# Patient Record
Sex: FEMALE | Race: WHITE | Employment: UNEMPLOYED | URBAN - METROPOLITAN AREA
[De-identification: names, ages, dates, MRNs, and addresses within clinical notes are randomized per-mention and may not be internally consistent; named-entity substitution may affect disease eponyms.]

---

## 2017-01-01 ENCOUNTER — HOME CARE VISIT (OUTPATIENT)
Dept: HOME HEALTH SERVICES | Facility: HOME HEALTH | Age: 72
End: 2017-01-01
Payer: MEDICARE

## 2017-01-01 ENCOUNTER — HOME CARE VISIT (OUTPATIENT)
Dept: SCHEDULING | Facility: HOME HEALTH | Age: 72
End: 2017-01-01
Payer: MEDICARE

## 2017-01-01 ENCOUNTER — APPOINTMENT (OUTPATIENT)
Dept: CT IMAGING | Age: 72
DRG: 871 | End: 2017-01-01
Attending: INTERNAL MEDICINE
Payer: MEDICARE

## 2017-01-01 ENCOUNTER — HOSPITAL ENCOUNTER (EMERGENCY)
Age: 72
Discharge: HOME | End: 2017-02-04
Attending: INTERNAL MEDICINE
Payer: MEDICARE

## 2017-01-01 ENCOUNTER — APPOINTMENT (OUTPATIENT)
Dept: GENERAL RADIOLOGY | Age: 72
DRG: 871 | End: 2017-01-01
Attending: PHYSICIAN ASSISTANT
Payer: MEDICARE

## 2017-01-01 ENCOUNTER — HOSPITAL ENCOUNTER (INPATIENT)
Age: 72
LOS: 4 days | DRG: 871 | End: 2017-03-20
Attending: INTERNAL MEDICINE | Admitting: HOSPITALIST
Payer: MEDICARE

## 2017-01-01 ENCOUNTER — HOME HEALTH ADMISSION (OUTPATIENT)
Dept: HOME HEALTH SERVICES | Facility: HOME HEALTH | Age: 72
End: 2017-01-01
Payer: MEDICARE

## 2017-01-01 ENCOUNTER — HOME CARE VISIT (OUTPATIENT)
Dept: HOME HEALTH SERVICES | Facility: HOME HEALTH | Age: 72
End: 2017-01-01

## 2017-01-01 ENCOUNTER — APPOINTMENT (OUTPATIENT)
Dept: GENERAL RADIOLOGY | Age: 72
End: 2017-01-01
Attending: EMERGENCY MEDICINE
Payer: MEDICARE

## 2017-01-01 ENCOUNTER — APPOINTMENT (OUTPATIENT)
Dept: GENERAL RADIOLOGY | Age: 72
DRG: 871 | End: 2017-01-01
Attending: INTERNAL MEDICINE
Payer: MEDICARE

## 2017-01-01 ENCOUNTER — HOSPITAL ENCOUNTER (INPATIENT)
Age: 72
LOS: 3 days | Discharge: NURSING, REHAB OR LONG-TERM CARE | DRG: 871 | End: 2017-03-07
Attending: INTERNAL MEDICINE | Admitting: INTERNAL MEDICINE
Payer: MEDICARE

## 2017-01-01 ENCOUNTER — HOSPITAL ENCOUNTER (OUTPATIENT)
Dept: MRI IMAGING | Age: 72
Discharge: HOME | End: 2017-02-08
Attending: ORTHOPAEDIC SURGERY
Payer: MEDICARE

## 2017-01-01 ENCOUNTER — APPOINTMENT (OUTPATIENT)
Dept: GENERAL RADIOLOGY | Age: 72
End: 2017-01-01
Attending: INTERNAL MEDICINE
Payer: MEDICARE

## 2017-01-01 ENCOUNTER — HOSPITAL ENCOUNTER (EMERGENCY)
Age: 72
Discharge: HOME | End: 2017-02-09
Attending: FAMILY MEDICINE
Payer: MEDICARE

## 2017-01-01 VITALS
RESPIRATION RATE: 16 BRPM | OXYGEN SATURATION: 95 % | WEIGHT: 212 LBS | HEART RATE: 69 BPM | TEMPERATURE: 96.6 F | HEIGHT: 65 IN | SYSTOLIC BLOOD PRESSURE: 121 MMHG | BODY MASS INDEX: 35.32 KG/M2 | DIASTOLIC BLOOD PRESSURE: 79 MMHG

## 2017-01-01 VITALS
RESPIRATION RATE: 18 BRPM | OXYGEN SATURATION: 98 % | HEIGHT: 65 IN | WEIGHT: 212 LBS | BODY MASS INDEX: 35.32 KG/M2 | DIASTOLIC BLOOD PRESSURE: 74 MMHG | HEART RATE: 87 BPM | SYSTOLIC BLOOD PRESSURE: 162 MMHG | TEMPERATURE: 97.9 F

## 2017-01-01 VITALS
HEART RATE: 76 BPM | SYSTOLIC BLOOD PRESSURE: 161 MMHG | TEMPERATURE: 96.8 F | OXYGEN SATURATION: 96 % | DIASTOLIC BLOOD PRESSURE: 84 MMHG | RESPIRATION RATE: 16 BRPM

## 2017-01-01 VITALS
TEMPERATURE: 98 F | OXYGEN SATURATION: 96 % | DIASTOLIC BLOOD PRESSURE: 48 MMHG | HEIGHT: 65 IN | WEIGHT: 216 LBS | HEART RATE: 56 BPM | RESPIRATION RATE: 16 BRPM | BODY MASS INDEX: 35.99 KG/M2 | SYSTOLIC BLOOD PRESSURE: 112 MMHG

## 2017-01-01 VITALS
DIASTOLIC BLOOD PRESSURE: 71 MMHG | OXYGEN SATURATION: 98 % | HEART RATE: 75 BPM | RESPIRATION RATE: 16 BRPM | SYSTOLIC BLOOD PRESSURE: 121 MMHG | BODY MASS INDEX: 35.32 KG/M2 | TEMPERATURE: 98 F | WEIGHT: 212 LBS | HEIGHT: 65 IN

## 2017-01-01 VITALS
DIASTOLIC BLOOD PRESSURE: 31 MMHG | TEMPERATURE: 99.7 F | SYSTOLIC BLOOD PRESSURE: 110 MMHG | HEART RATE: 106 BPM | HEIGHT: 65 IN | OXYGEN SATURATION: 68 % | BODY MASS INDEX: 34.05 KG/M2 | WEIGHT: 204.4 LBS | RESPIRATION RATE: 38 BRPM

## 2017-01-01 VITALS
OXYGEN SATURATION: 97 % | DIASTOLIC BLOOD PRESSURE: 80 MMHG | RESPIRATION RATE: 16 BRPM | TEMPERATURE: 98 F | SYSTOLIC BLOOD PRESSURE: 130 MMHG | HEART RATE: 78 BPM

## 2017-01-01 VITALS — DIASTOLIC BLOOD PRESSURE: 78 MMHG | SYSTOLIC BLOOD PRESSURE: 140 MMHG

## 2017-01-01 VITALS — DIASTOLIC BLOOD PRESSURE: 80 MMHG | SYSTOLIC BLOOD PRESSURE: 150 MMHG

## 2017-01-01 VITALS — DIASTOLIC BLOOD PRESSURE: 80 MMHG | SYSTOLIC BLOOD PRESSURE: 170 MMHG

## 2017-01-01 DIAGNOSIS — J11.1 INFLUENZA: ICD-10-CM

## 2017-01-01 DIAGNOSIS — J96.01 ACUTE RESPIRATORY FAILURE WITH HYPOXIA (HCC): ICD-10-CM

## 2017-01-01 DIAGNOSIS — R11.2 NAUSEA, VOMITING AND DIARRHEA: ICD-10-CM

## 2017-01-01 DIAGNOSIS — R41.0 TRANSIENT CONFUSION: ICD-10-CM

## 2017-01-01 DIAGNOSIS — E86.0 DEHYDRATION: ICD-10-CM

## 2017-01-01 DIAGNOSIS — R11.2 NON-INTRACTABLE VOMITING WITH NAUSEA, UNSPECIFIED VOMITING TYPE: ICD-10-CM

## 2017-01-01 DIAGNOSIS — S70.01XA CONTUSION OF RIGHT HIP, INITIAL ENCOUNTER: ICD-10-CM

## 2017-01-01 DIAGNOSIS — A41.9 SEPSIS, DUE TO UNSPECIFIED ORGANISM: Primary | ICD-10-CM

## 2017-01-01 DIAGNOSIS — R33.9 URINARY RETENTION: ICD-10-CM

## 2017-01-01 DIAGNOSIS — R19.7 NAUSEA, VOMITING AND DIARRHEA: ICD-10-CM

## 2017-01-01 DIAGNOSIS — N39.0 URINARY TRACT INFECTION, SITE UNSPECIFIED: Primary | ICD-10-CM

## 2017-01-01 DIAGNOSIS — M17.11 PRIMARY OSTEOARTHRITIS OF RIGHT KNEE: ICD-10-CM

## 2017-01-01 DIAGNOSIS — R41.82 ALTERED MENTAL STATUS, UNSPECIFIED ALTERED MENTAL STATUS TYPE: ICD-10-CM

## 2017-01-01 DIAGNOSIS — Z66 DO NOT RESUSCITATE: ICD-10-CM

## 2017-01-01 DIAGNOSIS — J18.9 HCAP (HEALTHCARE-ASSOCIATED PNEUMONIA): ICD-10-CM

## 2017-01-01 DIAGNOSIS — R65.10 SIRS (SYSTEMIC INFLAMMATORY RESPONSE SYNDROME) (HCC): Primary | ICD-10-CM
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
## 2017-01-01 DIAGNOSIS — R93.89 INFILTRATE NOTED ON IMAGING STUDY: ICD-10-CM

## 2017-01-01 DIAGNOSIS — R40.1 STUPOR: ICD-10-CM

## 2017-01-01 DIAGNOSIS — M25.551 RIGHT HIP PAIN: ICD-10-CM

## 2017-01-01 LAB
ALBUMIN SERPL BCP-MCNC: 2.6 G/DL (ref 3.4–5)
ALBUMIN SERPL BCP-MCNC: 2.7 G/DL (ref 3.4–5)
ALBUMIN SERPL BCP-MCNC: 2.7 G/DL (ref 3.4–5)
ALBUMIN SERPL BCP-MCNC: 2.9 G/DL (ref 3.4–5)
ALBUMIN SERPL BCP-MCNC: 3.8 G/DL (ref 3.4–5)
ALBUMIN SERPL BCP-MCNC: 4 G/DL (ref 3.4–5)
ALBUMIN/GLOB SERPL: 0.6 {RATIO} (ref 0.8–1.7)
ALBUMIN/GLOB SERPL: 0.8 {RATIO} (ref 0.8–1.7)
ALBUMIN/GLOB SERPL: 0.8 {RATIO} (ref 0.8–1.7)
ALBUMIN/GLOB SERPL: 0.9 {RATIO} (ref 0.8–1.7)
ALBUMIN/GLOB SERPL: 1 {RATIO} (ref 0.8–1.7)
ALBUMIN/GLOB SERPL: 1.2 {RATIO} (ref 0.8–1.7)
ALP SERPL-CCNC: 204 U/L (ref 45–117)
ALP SERPL-CCNC: 46 U/L (ref 45–117)
ALP SERPL-CCNC: 52 U/L (ref 45–117)
ALP SERPL-CCNC: 55 U/L (ref 45–117)
ALP SERPL-CCNC: 74 U/L (ref 45–117)
ALP SERPL-CCNC: 78 U/L (ref 45–117)
ALT SERPL-CCNC: 13 U/L (ref 13–56)
ALT SERPL-CCNC: 15 U/L (ref 13–56)
ALT SERPL-CCNC: 15 U/L (ref 13–56)
ALT SERPL-CCNC: 17 U/L (ref 13–56)
ALT SERPL-CCNC: 25 U/L (ref 13–56)
ALT SERPL-CCNC: 29 U/L (ref 13–56)
AMMONIA PLAS-SCNC: 35 UMOL/L (ref 11–32)
AMMONIA PLAS-SCNC: 36 UMOL/L (ref 11–32)
ANION GAP BLD CALC-SCNC: 12 MMOL/L (ref 3–18)
ANION GAP BLD CALC-SCNC: 13 MMOL/L (ref 3–18)
ANION GAP BLD CALC-SCNC: 15 MMOL/L (ref 3–18)
ANION GAP BLD CALC-SCNC: 7 MMOL/L (ref 3–18)
ANION GAP BLD CALC-SCNC: 7 MMOL/L (ref 3–18)
ANION GAP BLD CALC-SCNC: 8 MMOL/L (ref 3–18)
ANION GAP BLD CALC-SCNC: 9 MMOL/L (ref 3–18)
APPEARANCE UR: ABNORMAL
APPEARANCE UR: ABNORMAL
APPEARANCE UR: CLEAR
ARTERIAL PATENCY WRIST A: YES
AST SERPL W P-5'-P-CCNC: 19 U/L (ref 15–37)
AST SERPL W P-5'-P-CCNC: 23 U/L (ref 15–37)
AST SERPL W P-5'-P-CCNC: 27 U/L (ref 15–37)
AST SERPL W P-5'-P-CCNC: 28 U/L (ref 15–37)
AST SERPL W P-5'-P-CCNC: 30 U/L (ref 15–37)
AST SERPL W P-5'-P-CCNC: 37 U/L (ref 15–37)
ATRIAL RATE: 117 BPM
ATRIAL RATE: 62 BPM
ATRIAL RATE: 62 BPM
ATRIAL RATE: 65 BPM
ATRIAL RATE: 79 BPM
BACTERIA SPEC CULT: ABNORMAL
BACTERIA SPEC CULT: NORMAL
BACTERIA SPEC CULT: NORMAL
BACTERIA URNS QL MICRO: ABNORMAL /HPF
BASE DEFICIT BLD-SCNC: 2 MMOL/L
BASOPHILS # BLD AUTO: 0 K/UL (ref 0–0.06)
BASOPHILS # BLD AUTO: 0 K/UL (ref 0–0.1)
BASOPHILS # BLD: 0 % (ref 0–2)
BASOPHILS # BLD: 0 % (ref 0–3)
BASOPHILS # BLD: 1 % (ref 0–2)
BASOPHILS # BLD: 1 % (ref 0–2)
BDY SITE: ABNORMAL
BILIRUB SERPL-MCNC: 0.5 MG/DL (ref 0.2–1)
BILIRUB SERPL-MCNC: 0.5 MG/DL (ref 0.2–1)
BILIRUB SERPL-MCNC: 0.7 MG/DL (ref 0.2–1)
BILIRUB SERPL-MCNC: 1 MG/DL (ref 0.2–1)
BILIRUB SERPL-MCNC: 1.1 MG/DL (ref 0.2–1)
BILIRUB SERPL-MCNC: 1.7 MG/DL (ref 0.2–1)
BILIRUB UR QL: NEGATIVE
BNP SERPL-MCNC: 3394 PG/ML (ref 0–900)
BUN SERPL-MCNC: 17 MG/DL (ref 7–18)
BUN SERPL-MCNC: 17 MG/DL (ref 7–18)
BUN SERPL-MCNC: 18 MG/DL (ref 7–18)
BUN SERPL-MCNC: 21 MG/DL (ref 7–18)
BUN SERPL-MCNC: 22 MG/DL (ref 7–18)
BUN SERPL-MCNC: 52 MG/DL (ref 7–18)
BUN SERPL-MCNC: 57 MG/DL (ref 7–18)
BUN SERPL-MCNC: 66 MG/DL (ref 7–18)
BUN SERPL-MCNC: 75 MG/DL (ref 7–18)
BUN/CREAT SERPL: 16 (ref 12–20)
BUN/CREAT SERPL: 17 (ref 12–20)
BUN/CREAT SERPL: 18 (ref 12–20)
BUN/CREAT SERPL: 38 (ref 12–20)
BUN/CREAT SERPL: 44 (ref 12–20)
BUN/CREAT SERPL: 54 (ref 12–20)
BUN/CREAT SERPL: 56 (ref 12–20)
CALCIUM SERPL-MCNC: 8 MG/DL (ref 8.5–10.1)
CALCIUM SERPL-MCNC: 8.1 MG/DL (ref 8.5–10.1)
CALCIUM SERPL-MCNC: 8.1 MG/DL (ref 8.5–10.1)
CALCIUM SERPL-MCNC: 8.5 MG/DL (ref 8.5–10.1)
CALCIUM SERPL-MCNC: 8.8 MG/DL (ref 8.5–10.1)
CALCIUM SERPL-MCNC: 8.9 MG/DL (ref 8.5–10.1)
CALCIUM SERPL-MCNC: 9.6 MG/DL (ref 8.5–10.1)
CALCULATED P AXIS, ECG09: 31 DEGREES
CALCULATED P AXIS, ECG09: 35 DEGREES
CALCULATED P AXIS, ECG09: 35 DEGREES
CALCULATED P AXIS, ECG09: 39 DEGREES
CALCULATED R AXIS, ECG10: -17 DEGREES
CALCULATED R AXIS, ECG10: -22 DEGREES
CALCULATED R AXIS, ECG10: -25 DEGREES
CALCULATED R AXIS, ECG10: -31 DEGREES
CALCULATED R AXIS, ECG10: -31 DEGREES
CALCULATED T AXIS, ECG11: 107 DEGREES
CALCULATED T AXIS, ECG11: 121 DEGREES
CALCULATED T AXIS, ECG11: 22 DEGREES
CALCULATED T AXIS, ECG11: 22 DEGREES
CALCULATED T AXIS, ECG11: 51 DEGREES
CHLORIDE SERPL-SCNC: 100 MMOL/L (ref 100–108)
CHLORIDE SERPL-SCNC: 103 MMOL/L (ref 100–108)
CHLORIDE SERPL-SCNC: 104 MMOL/L (ref 100–108)
CHLORIDE SERPL-SCNC: 105 MMOL/L (ref 100–108)
CHLORIDE SERPL-SCNC: 106 MMOL/L (ref 100–108)
CHLORIDE SERPL-SCNC: 110 MMOL/L (ref 100–108)
CHLORIDE SERPL-SCNC: 113 MMOL/L (ref 100–108)
CHLORIDE SERPL-SCNC: 118 MMOL/L (ref 100–108)
CHLORIDE SERPL-SCNC: 97 MMOL/L (ref 100–108)
CK MB CFR SERPL CALC: 0.7 % (ref 0–4)
CK MB CFR SERPL CALC: 0.9 % (ref 0–4)
CK MB CFR SERPL CALC: 1.1 % (ref 0–4)
CK MB CFR SERPL CALC: ABNORMAL % (ref 0–4)
CK MB SERPL-MCNC: 0.9 NG/ML (ref 0.5–3.6)
CK MB SERPL-MCNC: 1 NG/ML (ref 5–25)
CK MB SERPL-MCNC: 1.3 NG/ML (ref 5–25)
CK MB SERPL-MCNC: <1 NG/ML (ref 5–25)
CK SERPL-CCNC: 113 U/L (ref 26–192)
CK SERPL-CCNC: 118 U/L (ref 26–192)
CK SERPL-CCNC: 135 U/L (ref 26–192)
CK SERPL-CCNC: 146 U/L (ref 26–192)
CK SERPL-CCNC: 84 U/L (ref 26–192)
CK SERPL-CCNC: 9 U/L (ref 26–192)
CO2 SERPL-SCNC: 20 MMOL/L (ref 21–32)
CO2 SERPL-SCNC: 21 MMOL/L (ref 21–32)
CO2 SERPL-SCNC: 26 MMOL/L (ref 21–32)
CO2 SERPL-SCNC: 27 MMOL/L (ref 21–32)
CO2 SERPL-SCNC: 28 MMOL/L (ref 21–32)
CO2 SERPL-SCNC: 28 MMOL/L (ref 21–32)
CO2 SERPL-SCNC: 29 MMOL/L (ref 21–32)
CO2 SERPL-SCNC: 30 MMOL/L (ref 21–32)
CO2 SERPL-SCNC: 37 MMOL/L (ref 21–32)
COLOR UR: ABNORMAL
COLOR UR: YELLOW
COLOR UR: YELLOW
CREAT SERPL-MCNC: 0.96 MG/DL (ref 0.6–1.3)
CREAT SERPL-MCNC: 0.97 MG/DL (ref 0.6–1.3)
CREAT SERPL-MCNC: 1.05 MG/DL (ref 0.6–1.3)
CREAT SERPL-MCNC: 1.17 MG/DL (ref 0.6–1.3)
CREAT SERPL-MCNC: 1.17 MG/DL (ref 0.6–1.3)
CREAT SERPL-MCNC: 1.22 MG/DL (ref 0.6–1.3)
CREAT SERPL-MCNC: 1.33 MG/DL (ref 0.6–1.3)
CREAT SERPL-MCNC: 1.39 MG/DL (ref 0.6–1.3)
CREAT SERPL-MCNC: 1.49 MG/DL (ref 0.6–1.3)
DIAGNOSIS, 93000: NORMAL
DIFFERENTIAL METHOD BLD: ABNORMAL
EOSINOPHIL # BLD: 0 K/UL (ref 0–0.4)
EOSINOPHIL # BLD: 0 K/UL (ref 0–0.4)
EOSINOPHIL # BLD: 0.1 K/UL (ref 0–0.4)
EOSINOPHIL # BLD: 0.2 K/UL (ref 0–0.4)
EOSINOPHIL NFR BLD: 0 % (ref 0–5)
EOSINOPHIL NFR BLD: 0 % (ref 0–5)
EOSINOPHIL NFR BLD: 2 % (ref 0–5)
EOSINOPHIL NFR BLD: 3 % (ref 0–5)
EOSINOPHIL NFR BLD: 3 % (ref 0–5)
EOSINOPHIL NFR BLD: 4 % (ref 0–5)
EPITH CASTS URNS QL MICRO: ABNORMAL /LPF (ref 0–5)
ERYTHROCYTE [DISTWIDTH] IN BLOOD BY AUTOMATED COUNT: 14 % (ref 11.6–14.5)
ERYTHROCYTE [DISTWIDTH] IN BLOOD BY AUTOMATED COUNT: 14 % (ref 11.6–14.5)
ERYTHROCYTE [DISTWIDTH] IN BLOOD BY AUTOMATED COUNT: 14.1 % (ref 11.6–14.5)
ERYTHROCYTE [DISTWIDTH] IN BLOOD BY AUTOMATED COUNT: 14.2 % (ref 11.6–14.5)
ERYTHROCYTE [DISTWIDTH] IN BLOOD BY AUTOMATED COUNT: 14.6 % (ref 11.6–14.5)
ERYTHROCYTE [DISTWIDTH] IN BLOOD BY AUTOMATED COUNT: 15 % (ref 11.6–14.5)
ERYTHROCYTE [DISTWIDTH] IN BLOOD BY AUTOMATED COUNT: 15.2 % (ref 11.6–14.5)
ERYTHROCYTE [DISTWIDTH] IN BLOOD BY AUTOMATED COUNT: 15.7 % (ref 11.6–14.5)
EST. AVERAGE GLUCOSE BLD GHB EST-MCNC: 206 MG/DL
EST. AVERAGE GLUCOSE BLD GHB EST-MCNC: 212 MG/DL
FLUAV AG NPH QL IA: NEGATIVE
FLUAV AG NPH QL IA: NEGATIVE
FLUBV AG NOSE QL IA: NEGATIVE
FLUBV AG NOSE QL IA: NEGATIVE
GAS FLOW.O2 O2 DELIVERY SYS: ABNORMAL L/MIN
GAS FLOW.O2 SETTING OXYMISER: 15 L/M
GLOBULIN SER CALC-MCNC: 3.3 G/DL (ref 2–4)
GLOBULIN SER CALC-MCNC: 3.3 G/DL (ref 2–4)
GLOBULIN SER CALC-MCNC: 3.4 G/DL (ref 2–4)
GLOBULIN SER CALC-MCNC: 3.4 G/DL (ref 2–4)
GLOBULIN SER CALC-MCNC: 4.1 G/DL (ref 2–4)
GLOBULIN SER CALC-MCNC: 4.6 G/DL (ref 2–4)
GLUCOSE BLD STRIP.AUTO-MCNC: 130 MG/DL (ref 70–110)
GLUCOSE BLD STRIP.AUTO-MCNC: 135 MG/DL (ref 70–110)
GLUCOSE BLD STRIP.AUTO-MCNC: 138 MG/DL (ref 70–110)
GLUCOSE BLD STRIP.AUTO-MCNC: 149 MG/DL (ref 70–110)
GLUCOSE BLD STRIP.AUTO-MCNC: 178 MG/DL (ref 70–110)
GLUCOSE BLD STRIP.AUTO-MCNC: 186 MG/DL (ref 70–110)
GLUCOSE BLD STRIP.AUTO-MCNC: 197 MG/DL (ref 70–110)
GLUCOSE BLD STRIP.AUTO-MCNC: 207 MG/DL (ref 70–110)
GLUCOSE BLD STRIP.AUTO-MCNC: 217 MG/DL (ref 70–110)
GLUCOSE BLD STRIP.AUTO-MCNC: 219 MG/DL (ref 70–110)
GLUCOSE BLD STRIP.AUTO-MCNC: 224 MG/DL (ref 70–110)
GLUCOSE BLD STRIP.AUTO-MCNC: 234 MG/DL (ref 70–110)
GLUCOSE BLD STRIP.AUTO-MCNC: 242 MG/DL (ref 70–110)
GLUCOSE BLD STRIP.AUTO-MCNC: 246 MG/DL (ref 70–110)
GLUCOSE BLD STRIP.AUTO-MCNC: 263 MG/DL (ref 70–110)
GLUCOSE BLD STRIP.AUTO-MCNC: 266 MG/DL (ref 70–110)
GLUCOSE BLD STRIP.AUTO-MCNC: 266 MG/DL (ref 70–110)
GLUCOSE BLD STRIP.AUTO-MCNC: 274 MG/DL (ref 70–110)
GLUCOSE BLD STRIP.AUTO-MCNC: 295 MG/DL (ref 70–110)
GLUCOSE BLD STRIP.AUTO-MCNC: 298 MG/DL (ref 70–110)
GLUCOSE BLD STRIP.AUTO-MCNC: 305 MG/DL (ref 70–110)
GLUCOSE BLD STRIP.AUTO-MCNC: 308 MG/DL (ref 70–110)
GLUCOSE BLD STRIP.AUTO-MCNC: 427 MG/DL (ref 70–110)
GLUCOSE BLD STRIP.AUTO-MCNC: 481 MG/DL (ref 70–110)
GLUCOSE SERPL-MCNC: 145 MG/DL (ref 74–99)
GLUCOSE SERPL-MCNC: 164 MG/DL (ref 74–99)
GLUCOSE SERPL-MCNC: 175 MG/DL (ref 74–99)
GLUCOSE SERPL-MCNC: 228 MG/DL (ref 74–99)
GLUCOSE SERPL-MCNC: 262 MG/DL (ref 74–99)
GLUCOSE SERPL-MCNC: 274 MG/DL (ref 74–99)
GLUCOSE SERPL-MCNC: 287 MG/DL (ref 74–99)
GLUCOSE SERPL-MCNC: 301 MG/DL (ref 74–99)
GLUCOSE SERPL-MCNC: 348 MG/DL (ref 74–99)
GLUCOSE UR STRIP.AUTO-MCNC: 100 MG/DL
GLUCOSE UR STRIP.AUTO-MCNC: 250 MG/DL
GLUCOSE UR STRIP.AUTO-MCNC: NEGATIVE MG/DL
HBA1C MFR BLD: 8.8 % (ref 4.5–5.6)
HBA1C MFR BLD: 9 % (ref 4.5–5.6)
HCO3 BLD-SCNC: 23 MMOL/L (ref 22–26)
HCT VFR BLD AUTO: 30.5 % (ref 35–45)
HCT VFR BLD AUTO: 32.3 % (ref 35–45)
HCT VFR BLD AUTO: 33.5 % (ref 35–45)
HCT VFR BLD AUTO: 33.7 % (ref 35–45)
HCT VFR BLD AUTO: 33.9 % (ref 35–45)
HCT VFR BLD AUTO: 37.5 % (ref 35–45)
HCT VFR BLD AUTO: 38.7 % (ref 35–45)
HCT VFR BLD AUTO: 41.7 % (ref 35–45)
HGB BLD-MCNC: 10 G/DL (ref 12–16)
HGB BLD-MCNC: 10.3 G/DL (ref 12–16)
HGB BLD-MCNC: 10.5 G/DL (ref 12–16)
HGB BLD-MCNC: 10.6 G/DL (ref 12–16)
HGB BLD-MCNC: 10.7 G/DL (ref 12–16)
HGB BLD-MCNC: 12.3 G/DL (ref 12–16)
HGB BLD-MCNC: 12.8 G/DL (ref 12–16)
HGB BLD-MCNC: 13.3 G/DL (ref 12–16)
HGB UR QL STRIP: ABNORMAL
HGB UR QL STRIP: NEGATIVE
HGB UR QL STRIP: NEGATIVE
HYALINE CASTS URNS QL MICRO: ABNORMAL /LPF (ref 0–2)
KETONES UR QL STRIP.AUTO: NEGATIVE MG/DL
LACTATE SERPL-SCNC: 1 MMOL/L (ref 0.4–2)
LACTATE SERPL-SCNC: 1.7 MMOL/L (ref 0.4–2)
LACTATE SERPL-SCNC: 2.6 MMOL/L (ref 0.4–2)
LACTATE SERPL-SCNC: 3.2 MMOL/L (ref 0.4–2)
LEUKOCYTE ESTERASE UR QL STRIP.AUTO: ABNORMAL
LYMPHOCYTES # BLD AUTO: 11 % (ref 20–51)
LYMPHOCYTES # BLD AUTO: 17 % (ref 21–52)
LYMPHOCYTES # BLD AUTO: 19 % (ref 21–52)
LYMPHOCYTES # BLD AUTO: 25 % (ref 21–52)
LYMPHOCYTES # BLD AUTO: 29 % (ref 21–52)
LYMPHOCYTES # BLD AUTO: 6 % (ref 21–52)
LYMPHOCYTES # BLD AUTO: 7 % (ref 21–52)
LYMPHOCYTES # BLD AUTO: 7 % (ref 21–52)
LYMPHOCYTES # BLD: 0.4 K/UL (ref 0.9–3.6)
LYMPHOCYTES # BLD: 0.5 K/UL (ref 0.9–3.6)
LYMPHOCYTES # BLD: 0.7 K/UL (ref 0.9–3.6)
LYMPHOCYTES # BLD: 1 K/UL (ref 0.8–3.5)
LYMPHOCYTES # BLD: 1 K/UL (ref 0.9–3.6)
LYMPHOCYTES # BLD: 1.1 K/UL (ref 0.9–3.6)
MAGNESIUM SERPL-MCNC: 1.1 MG/DL (ref 1.8–2.4)
MAGNESIUM SERPL-MCNC: 1.3 MG/DL (ref 1.8–2.4)
MAGNESIUM SERPL-MCNC: 1.7 MG/DL (ref 1.8–2.4)
MCH RBC QN AUTO: 28.5 PG (ref 24–34)
MCH RBC QN AUTO: 29.1 PG (ref 24–34)
MCH RBC QN AUTO: 29.1 PG (ref 24–34)
MCH RBC QN AUTO: 29.3 PG (ref 24–34)
MCH RBC QN AUTO: 29.5 PG (ref 24–34)
MCH RBC QN AUTO: 29.6 PG (ref 24–34)
MCH RBC QN AUTO: 30 PG (ref 24–34)
MCH RBC QN AUTO: 30.7 PG (ref 24–34)
MCHC RBC AUTO-ENTMCNC: 31.2 G/DL (ref 31–37)
MCHC RBC AUTO-ENTMCNC: 31.6 G/DL (ref 31–37)
MCHC RBC AUTO-ENTMCNC: 31.6 G/DL (ref 31–37)
MCHC RBC AUTO-ENTMCNC: 31.9 G/DL (ref 31–37)
MCHC RBC AUTO-ENTMCNC: 31.9 G/DL (ref 31–37)
MCHC RBC AUTO-ENTMCNC: 32.8 G/DL (ref 31–37)
MCHC RBC AUTO-ENTMCNC: 32.8 G/DL (ref 31–37)
MCHC RBC AUTO-ENTMCNC: 33.1 G/DL (ref 31–37)
MCV RBC AUTO: 88.9 FL (ref 74–97)
MCV RBC AUTO: 90.1 FL (ref 74–97)
MCV RBC AUTO: 91.6 FL (ref 74–97)
MCV RBC AUTO: 92 FL (ref 74–97)
MCV RBC AUTO: 92.7 FL (ref 74–97)
MCV RBC AUTO: 92.8 FL (ref 74–97)
MCV RBC AUTO: 93.4 FL (ref 74–97)
MCV RBC AUTO: 93.4 FL (ref 74–97)
MONOCYTES # BLD: 0.4 K/UL (ref 0.05–1.2)
MONOCYTES # BLD: 0.4 K/UL (ref 0–1)
MONOCYTES # BLD: 0.5 K/UL (ref 0.05–1.2)
MONOCYTES # BLD: 0.5 K/UL (ref 0.05–1.2)
MONOCYTES # BLD: 0.6 K/UL (ref 0.05–1.2)
MONOCYTES # BLD: 0.7 K/UL (ref 0.05–1.2)
MONOCYTES NFR BLD AUTO: 10 % (ref 3–10)
MONOCYTES NFR BLD AUTO: 11 % (ref 3–10)
MONOCYTES NFR BLD AUTO: 11 % (ref 3–10)
MONOCYTES NFR BLD AUTO: 12 % (ref 3–10)
MONOCYTES NFR BLD AUTO: 5 % (ref 2–9)
MONOCYTES NFR BLD AUTO: 5 % (ref 3–10)
MONOCYTES NFR BLD AUTO: 5 % (ref 3–10)
MONOCYTES NFR BLD AUTO: 9 % (ref 3–10)
NEUTS SEG # BLD: 13.3 K/UL (ref 1.8–8)
NEUTS SEG # BLD: 2.1 K/UL (ref 1.8–8)
NEUTS SEG # BLD: 2.1 K/UL (ref 1.8–8)
NEUTS SEG # BLD: 2.6 K/UL (ref 1.8–8)
NEUTS SEG # BLD: 3.4 K/UL (ref 1.8–8)
NEUTS SEG # BLD: 4.5 K/UL (ref 1.8–8)
NEUTS SEG # BLD: 7.4 K/UL (ref 1.8–8)
NEUTS SEG # BLD: 8.2 K/UL (ref 1.8–8)
NEUTS SEG NFR BLD AUTO: 56 % (ref 40–73)
NEUTS SEG NFR BLD AUTO: 62 % (ref 40–73)
NEUTS SEG NFR BLD AUTO: 66 % (ref 40–73)
NEUTS SEG NFR BLD AUTO: 68 % (ref 40–73)
NEUTS SEG NFR BLD AUTO: 82 % (ref 40–73)
NEUTS SEG NFR BLD AUTO: 84 % (ref 42–75)
NEUTS SEG NFR BLD AUTO: 87 % (ref 40–73)
NEUTS SEG NFR BLD AUTO: 88 % (ref 40–73)
NITRITE UR QL STRIP.AUTO: NEGATIVE
NITRITE UR QL STRIP.AUTO: NEGATIVE
NITRITE UR QL STRIP.AUTO: POSITIVE
O2/TOTAL GAS SETTING VFR VENT: 10 %
P-R INTERVAL, ECG05: 142 MS
P-R INTERVAL, ECG05: 168 MS
P-R INTERVAL, ECG05: 168 MS
P-R INTERVAL, ECG05: 172 MS
PCO2 BLD: 37.7 MMHG (ref 35–45)
PH BLD: 7.39 [PH] (ref 7.35–7.45)
PH UR STRIP: 5 [PH] (ref 5–8)
PH UR STRIP: 6.5 [PH] (ref 5–8)
PH UR STRIP: 7 [PH] (ref 5–8)
PLATELET # BLD AUTO: 111 K/UL (ref 135–420)
PLATELET # BLD AUTO: 159 K/UL (ref 135–420)
PLATELET # BLD AUTO: 171 K/UL (ref 135–420)
PLATELET # BLD AUTO: 202 K/UL (ref 135–420)
PLATELET # BLD AUTO: 63 K/UL (ref 135–420)
PLATELET # BLD AUTO: 68 K/UL (ref 135–420)
PLATELET # BLD AUTO: 69 K/UL (ref 135–420)
PLATELET # BLD AUTO: 82 K/UL (ref 135–420)
PLATELET COMMENTS,PCOM: ABNORMAL
PMV BLD AUTO: 10.7 FL (ref 9.2–11.8)
PMV BLD AUTO: 11.2 FL (ref 9.2–11.8)
PMV BLD AUTO: 11.3 FL (ref 9.2–11.8)
PMV BLD AUTO: 11.4 FL (ref 9.2–11.8)
PMV BLD AUTO: 11.5 FL (ref 9.2–11.8)
PMV BLD AUTO: 11.6 FL (ref 9.2–11.8)
PO2 BLD: 65 MMHG (ref 80–100)
POTASSIUM SERPL-SCNC: 3.5 MMOL/L (ref 3.5–5.5)
POTASSIUM SERPL-SCNC: 3.6 MMOL/L (ref 3.5–5.5)
POTASSIUM SERPL-SCNC: 3.7 MMOL/L (ref 3.5–5.5)
POTASSIUM SERPL-SCNC: 4.1 MMOL/L (ref 3.5–5.5)
POTASSIUM SERPL-SCNC: 4.6 MMOL/L (ref 3.5–5.5)
POTASSIUM SERPL-SCNC: 5 MMOL/L (ref 3.5–5.5)
POTASSIUM SERPL-SCNC: 5.1 MMOL/L (ref 3.5–5.5)
PROT SERPL-MCNC: 6.1 G/DL (ref 6.4–8.2)
PROT SERPL-MCNC: 6.1 G/DL (ref 6.4–8.2)
PROT SERPL-MCNC: 6.2 G/DL (ref 6.4–8.2)
PROT SERPL-MCNC: 7.1 G/DL (ref 6.4–8.2)
PROT SERPL-MCNC: 7.2 G/DL (ref 6.4–8.2)
PROT SERPL-MCNC: 8.1 G/DL (ref 6.4–8.2)
PROT UR STRIP-MCNC: 30 MG/DL
PROT UR STRIP-MCNC: NEGATIVE MG/DL
PROT UR STRIP-MCNC: NEGATIVE MG/DL
Q-T INTERVAL, ECG07: 368 MS
Q-T INTERVAL, ECG07: 422 MS
Q-T INTERVAL, ECG07: 470 MS
Q-T INTERVAL, ECG07: 496 MS
Q-T INTERVAL, ECG07: 496 MS
QRS DURATION, ECG06: 136 MS
QRS DURATION, ECG06: 142 MS
QRS DURATION, ECG06: 86 MS
QRS DURATION, ECG06: 86 MS
QRS DURATION, ECG06: 94 MS
QTC CALCULATION (BEZET), ECG08: 438 MS
QTC CALCULATION (BEZET), ECG08: 503 MS
QTC CALCULATION (BEZET), ECG08: 503 MS
QTC CALCULATION (BEZET), ECG08: 507 MS
QTC CALCULATION (BEZET), ECG08: 538 MS
RBC # BLD AUTO: 3.33 M/UL (ref 4.2–5.3)
RBC # BLD AUTO: 3.51 M/UL (ref 4.2–5.3)
RBC # BLD AUTO: 3.61 M/UL (ref 4.2–5.3)
RBC # BLD AUTO: 3.63 M/UL (ref 4.2–5.3)
RBC # BLD AUTO: 3.72 M/UL (ref 4.2–5.3)
RBC # BLD AUTO: 4.17 M/UL (ref 4.2–5.3)
RBC # BLD AUTO: 4.22 M/UL (ref 4.2–5.3)
RBC # BLD AUTO: 4.5 M/UL (ref 4.2–5.3)
RBC #/AREA URNS HPF: ABNORMAL /HPF (ref 0–5)
RBC MORPH BLD: ABNORMAL
SAO2 % BLD: 92 % (ref 92–97)
SERVICE CMNT-IMP: ABNORMAL
SERVICE CMNT-IMP: ABNORMAL
SERVICE CMNT-IMP: NORMAL
SERVICE CMNT-IMP: NORMAL
SODIUM SERPL-SCNC: 139 MMOL/L (ref 136–145)
SODIUM SERPL-SCNC: 139 MMOL/L (ref 136–145)
SODIUM SERPL-SCNC: 141 MMOL/L (ref 136–145)
SODIUM SERPL-SCNC: 141 MMOL/L (ref 136–145)
SODIUM SERPL-SCNC: 142 MMOL/L (ref 136–145)
SODIUM SERPL-SCNC: 142 MMOL/L (ref 136–145)
SODIUM SERPL-SCNC: 145 MMOL/L (ref 136–145)
SODIUM SERPL-SCNC: 146 MMOL/L (ref 136–145)
SODIUM SERPL-SCNC: 156 MMOL/L (ref 136–145)
SP GR UR REFRACTOMETRY: 1.01 (ref 1–1.03)
SP GR UR REFRACTOMETRY: 1.02 (ref 1–1.03)
SP GR UR REFRACTOMETRY: 1.02 (ref 1–1.03)
SPECIMEN TYPE: ABNORMAL
TOTAL RESP. RATE, ITRR: 30
TROPONIN I SERPL-MCNC: 0.03 NG/ML (ref 0–0.06)
TROPONIN I SERPL-MCNC: 0.13 NG/ML (ref 0–0.06)
TROPONIN I SERPL-MCNC: 0.16 NG/ML (ref 0–0.06)
TROPONIN I SERPL-MCNC: 0.3 NG/ML (ref 0–0.06)
TROPONIN I SERPL-MCNC: 0.43 NG/ML (ref 0–0.06)
TROPONIN I SERPL-MCNC: <0.02 NG/ML (ref 0–0.06)
UROBILINOGEN UR QL STRIP.AUTO: 0.2 EU/DL (ref 0.2–1)
UROBILINOGEN UR QL STRIP.AUTO: 0.2 EU/DL (ref 0.2–1)
UROBILINOGEN UR QL STRIP.AUTO: 1 EU/DL (ref 0.2–1)
VANCOMYCIN TROUGH SERPL-MCNC: 18.6 UG/ML (ref 10–20)
VENTRICULAR RATE, ECG03: 114 BPM
VENTRICULAR RATE, ECG03: 62 BPM
VENTRICULAR RATE, ECG03: 62 BPM
VENTRICULAR RATE, ECG03: 65 BPM
VENTRICULAR RATE, ECG03: 79 BPM
WBC # BLD AUTO: 15.2 K/UL (ref 4.6–13.2)
WBC # BLD AUTO: 3.1 K/UL (ref 4.6–13.2)
WBC # BLD AUTO: 3.8 K/UL (ref 4.6–13.2)
WBC # BLD AUTO: 4.2 K/UL (ref 4.6–13.2)
WBC # BLD AUTO: 5.2 K/UL (ref 4.6–13.2)
WBC # BLD AUTO: 5.6 K/UL (ref 4.6–13.2)
WBC # BLD AUTO: 8.8 K/UL (ref 4.6–13.2)
WBC # BLD AUTO: 9.3 K/UL (ref 4.6–13.2)
WBC MORPH BLD: ABNORMAL
WBC URNS QL MICRO: >100 /HPF (ref 0–5)
WBC URNS QL MICRO: ABNORMAL /HPF (ref 0–5)
WBC URNS QL MICRO: ABNORMAL /HPF (ref 0–5)
YEAST BUDDING URNS QL: 2
YEAST URNS QL MICRO: ABNORMAL

## 2017-01-01 PROCEDURE — 76450000000

## 2017-01-01 PROCEDURE — 74011000258 HC RX REV CODE- 258: Performed by: PHYSICIAN ASSISTANT

## 2017-01-01 PROCEDURE — 3331090001 HH PPS REVENUE CREDIT

## 2017-01-01 PROCEDURE — 96365 THER/PROPH/DIAG IV INF INIT: CPT

## 2017-01-01 PROCEDURE — 74011250636 HC RX REV CODE- 250/636: Performed by: INTERNAL MEDICINE

## 2017-01-01 PROCEDURE — 74011000250 HC RX REV CODE- 250: Performed by: HOSPITALIST

## 2017-01-01 PROCEDURE — 96361 HYDRATE IV INFUSION ADD-ON: CPT

## 2017-01-01 PROCEDURE — 83605 ASSAY OF LACTIC ACID: CPT | Performed by: PHYSICIAN ASSISTANT

## 2017-01-01 PROCEDURE — G0495 RN CARE TRAIN/EDU IN HH: HCPCS

## 2017-01-01 PROCEDURE — 3331090002 HH PPS REVENUE DEBIT

## 2017-01-01 PROCEDURE — 87086 URINE CULTURE/COLONY COUNT: CPT | Performed by: INTERNAL MEDICINE

## 2017-01-01 PROCEDURE — 74011250637 HC RX REV CODE- 250/637: Performed by: INTERNAL MEDICINE

## 2017-01-01 PROCEDURE — 87106 FUNGI IDENTIFICATION YEAST: CPT | Performed by: INTERNAL MEDICINE

## 2017-01-01 PROCEDURE — 82550 ASSAY OF CK (CPK): CPT | Performed by: PHYSICIAN ASSISTANT

## 2017-01-01 PROCEDURE — G0157 HHC PT ASSISTANT EA 15: HCPCS

## 2017-01-01 PROCEDURE — 77010033711 HC HIGH FLOW OXYGEN

## 2017-01-01 PROCEDURE — 85025 COMPLETE CBC W/AUTO DIFF WBC: CPT | Performed by: INTERNAL MEDICINE

## 2017-01-01 PROCEDURE — 77030035694 HC MSK BIPAP FLL FAC PERFMAX PHIL -B

## 2017-01-01 PROCEDURE — G0152 HHCP-SERV OF OT,EA 15 MIN: HCPCS

## 2017-01-01 PROCEDURE — 82140 ASSAY OF AMMONIA: CPT | Performed by: INTERNAL MEDICINE

## 2017-01-01 PROCEDURE — 74011250636 HC RX REV CODE- 250/636: Performed by: PHYSICIAN ASSISTANT

## 2017-01-01 PROCEDURE — C9113 INJ PANTOPRAZOLE SODIUM, VIA: HCPCS | Performed by: HOSPITALIST

## 2017-01-01 PROCEDURE — 80053 COMPREHEN METABOLIC PANEL: CPT | Performed by: INTERNAL MEDICINE

## 2017-01-01 PROCEDURE — 74011250636 HC RX REV CODE- 250/636: Performed by: HOSPITALIST

## 2017-01-01 PROCEDURE — 74011636637 HC RX REV CODE- 636/637: Performed by: INTERNAL MEDICINE

## 2017-01-01 PROCEDURE — 36415 COLL VENOUS BLD VENIPUNCTURE: CPT | Performed by: INTERNAL MEDICINE

## 2017-01-01 PROCEDURE — 97110 THERAPEUTIC EXERCISES: CPT

## 2017-01-01 PROCEDURE — 3331090003 HH PPS REVENUE ADJ

## 2017-01-01 PROCEDURE — 94640 AIRWAY INHALATION TREATMENT: CPT

## 2017-01-01 PROCEDURE — 74011636637 HC RX REV CODE- 636/637: Performed by: HOSPITALIST

## 2017-01-01 PROCEDURE — 74011000250 HC RX REV CODE- 250: Performed by: FAMILY MEDICINE

## 2017-01-01 PROCEDURE — 36415 COLL VENOUS BLD VENIPUNCTURE: CPT | Performed by: PHYSICIAN ASSISTANT

## 2017-01-01 PROCEDURE — 85025 COMPLETE CBC W/AUTO DIFF WBC: CPT | Performed by: PHYSICIAN ASSISTANT

## 2017-01-01 PROCEDURE — 83735 ASSAY OF MAGNESIUM: CPT | Performed by: INTERNAL MEDICINE

## 2017-01-01 PROCEDURE — 77010033678 HC OXYGEN DAILY

## 2017-01-01 PROCEDURE — 74011636320 HC RX REV CODE- 636/320: Performed by: INTERNAL MEDICINE

## 2017-01-01 PROCEDURE — 74011000250 HC RX REV CODE- 250: Performed by: PHYSICIAN ASSISTANT

## 2017-01-01 PROCEDURE — 87804 INFLUENZA ASSAY W/OPTIC: CPT | Performed by: PHYSICIAN ASSISTANT

## 2017-01-01 PROCEDURE — G0151 HHCP-SERV OF PT,EA 15 MIN: HCPCS

## 2017-01-01 PROCEDURE — 94660 CPAP INITIATION&MGMT: CPT

## 2017-01-01 PROCEDURE — 82962 GLUCOSE BLOOD TEST: CPT

## 2017-01-01 PROCEDURE — 96374 THER/PROPH/DIAG INJ IV PUSH: CPT

## 2017-01-01 PROCEDURE — 80202 ASSAY OF VANCOMYCIN: CPT | Performed by: PHYSICIAN ASSISTANT

## 2017-01-01 PROCEDURE — 80048 BASIC METABOLIC PNL TOTAL CA: CPT | Performed by: PHYSICIAN ASSISTANT

## 2017-01-01 PROCEDURE — 97162 PT EVAL MOD COMPLEX 30 MIN: CPT

## 2017-01-01 PROCEDURE — 99285 EMERGENCY DEPT VISIT HI MDM: CPT

## 2017-01-01 PROCEDURE — 73721 MRI JNT OF LWR EXTRE W/O DYE: CPT

## 2017-01-01 PROCEDURE — 93005 ELECTROCARDIOGRAM TRACING: CPT

## 2017-01-01 PROCEDURE — 71010 XR CHEST PORT: CPT

## 2017-01-01 PROCEDURE — 51702 INSERT TEMP BLADDER CATH: CPT

## 2017-01-01 PROCEDURE — 74177 CT ABD & PELVIS W/CONTRAST: CPT

## 2017-01-01 PROCEDURE — 83036 HEMOGLOBIN GLYCOSYLATED A1C: CPT | Performed by: HOSPITALIST

## 2017-01-01 PROCEDURE — 97530 THERAPEUTIC ACTIVITIES: CPT

## 2017-01-01 PROCEDURE — 74011250637 HC RX REV CODE- 250/637: Performed by: EMERGENCY MEDICINE

## 2017-01-01 PROCEDURE — 81001 URINALYSIS AUTO W/SCOPE: CPT | Performed by: INTERNAL MEDICINE

## 2017-01-01 PROCEDURE — 51701 INSERT BLADDER CATHETER: CPT

## 2017-01-01 PROCEDURE — 74011250637 HC RX REV CODE- 250/637: Performed by: HOSPITALIST

## 2017-01-01 PROCEDURE — 65660000000 HC RM CCU STEPDOWN

## 2017-01-01 PROCEDURE — 70450 CT HEAD/BRAIN W/O DYE: CPT

## 2017-01-01 PROCEDURE — 65270000029 HC RM PRIVATE

## 2017-01-01 PROCEDURE — 97116 GAIT TRAINING THERAPY: CPT

## 2017-01-01 PROCEDURE — 82140 ASSAY OF AMMONIA: CPT | Performed by: HOSPITALIST

## 2017-01-01 PROCEDURE — 80048 BASIC METABOLIC PNL TOTAL CA: CPT | Performed by: HOSPITALIST

## 2017-01-01 PROCEDURE — G0299 HHS/HOSPICE OF RN EA 15 MIN: HCPCS

## 2017-01-01 PROCEDURE — 87040 BLOOD CULTURE FOR BACTERIA: CPT | Performed by: PHYSICIAN ASSISTANT

## 2017-01-01 PROCEDURE — 83036 HEMOGLOBIN GLYCOSYLATED A1C: CPT | Performed by: INTERNAL MEDICINE

## 2017-01-01 PROCEDURE — 96367 TX/PROPH/DG ADDL SEQ IV INF: CPT

## 2017-01-01 PROCEDURE — 96375 TX/PRO/DX INJ NEW DRUG ADDON: CPT

## 2017-01-01 PROCEDURE — 82803 BLOOD GASES ANY COMBINATION: CPT

## 2017-01-01 PROCEDURE — 72170 X-RAY EXAM OF PELVIS: CPT

## 2017-01-01 PROCEDURE — 96366 THER/PROPH/DIAG IV INF ADDON: CPT

## 2017-01-01 PROCEDURE — 83880 ASSAY OF NATRIURETIC PEPTIDE: CPT | Performed by: PHYSICIAN ASSISTANT

## 2017-01-01 PROCEDURE — 83605 ASSAY OF LACTIC ACID: CPT | Performed by: INTERNAL MEDICINE

## 2017-01-01 PROCEDURE — 94760 N-INVAS EAR/PLS OXIMETRY 1: CPT

## 2017-01-01 PROCEDURE — 97165 OT EVAL LOW COMPLEX 30 MIN: CPT

## 2017-01-01 PROCEDURE — 82550 ASSAY OF CK (CPK): CPT | Performed by: INTERNAL MEDICINE

## 2017-01-01 PROCEDURE — 87804 INFLUENZA ASSAY W/OPTIC: CPT | Performed by: INTERNAL MEDICINE

## 2017-01-01 PROCEDURE — 73560 X-RAY EXAM OF KNEE 1 OR 2: CPT

## 2017-01-01 PROCEDURE — 97535 SELF CARE MNGMENT TRAINING: CPT

## 2017-01-01 PROCEDURE — 80053 COMPREHEN METABOLIC PANEL: CPT | Performed by: PHYSICIAN ASSISTANT

## 2017-01-01 PROCEDURE — 77030005514 HC CATH URETH FOL14 BARD -A

## 2017-01-01 PROCEDURE — 81001 URINALYSIS AUTO W/SCOPE: CPT | Performed by: PHYSICIAN ASSISTANT

## 2017-01-01 PROCEDURE — 87040 BLOOD CULTURE FOR BACTERIA: CPT | Performed by: INTERNAL MEDICINE

## 2017-01-01 PROCEDURE — 77030009834 HC MSK O2 TRACH VYRM -A

## 2017-01-01 PROCEDURE — 93306 TTE W/DOPPLER COMPLETE: CPT

## 2017-01-01 PROCEDURE — 400013 HH SOC

## 2017-01-01 PROCEDURE — 99284 EMERGENCY DEPT VISIT MOD MDM: CPT

## 2017-01-01 PROCEDURE — 77030013140 HC MSK NEB VYRM -A

## 2017-01-01 PROCEDURE — 36600 WITHDRAWAL OF ARTERIAL BLOOD: CPT

## 2017-01-01 PROCEDURE — 5A09357 ASSISTANCE WITH RESPIRATORY VENTILATION, LESS THAN 24 CONSECUTIVE HOURS, CONTINUOUS POSITIVE AIRWAY PRESSURE: ICD-10-PCS | Performed by: HOSPITALIST

## 2017-01-01 PROCEDURE — 77030027138 HC INCENT SPIROMETER -A

## 2017-01-01 PROCEDURE — 36415 COLL VENOUS BLD VENIPUNCTURE: CPT | Performed by: HOSPITALIST

## 2017-01-01 RX ORDER — ONDANSETRON 2 MG/ML
4 INJECTION INTRAMUSCULAR; INTRAVENOUS
Status: DISCONTINUED | OUTPATIENT
Start: 2017-01-01 | End: 2017-01-01 | Stop reason: HOSPADM

## 2017-01-01 RX ORDER — IPRATROPIUM BROMIDE AND ALBUTEROL SULFATE 2.5; .5 MG/3ML; MG/3ML
3 SOLUTION RESPIRATORY (INHALATION)
Status: COMPLETED | OUTPATIENT
Start: 2017-01-01 | End: 2017-01-01

## 2017-01-01 RX ORDER — IBUPROFEN 600 MG/1
600 TABLET ORAL ONCE
Status: COMPLETED | OUTPATIENT
Start: 2017-01-01 | End: 2017-01-01

## 2017-01-01 RX ORDER — INSULIN GLARGINE 100 [IU]/ML
40 INJECTION, SOLUTION SUBCUTANEOUS DAILY
Status: DISCONTINUED | OUTPATIENT
Start: 2017-01-01 | End: 2017-01-01

## 2017-01-01 RX ORDER — INSULIN LISPRO 100 [IU]/ML
INJECTION, SOLUTION INTRAVENOUS; SUBCUTANEOUS
Status: DISCONTINUED | OUTPATIENT
Start: 2017-01-01 | End: 2017-01-01

## 2017-01-01 RX ORDER — SODIUM CHLORIDE 9 MG/ML
75 INJECTION, SOLUTION INTRAVENOUS CONTINUOUS
Status: DISPENSED | OUTPATIENT
Start: 2017-01-01 | End: 2017-01-01

## 2017-01-01 RX ORDER — SODIUM CHLORIDE 9 MG/ML
75 INJECTION, SOLUTION INTRAVENOUS CONTINUOUS
Status: DISCONTINUED | OUTPATIENT
Start: 2017-01-01 | End: 2017-01-01

## 2017-01-01 RX ORDER — POTASSIUM CHLORIDE 1500 MG/1
20 TABLET, FILM COATED, EXTENDED RELEASE ORAL DAILY
Qty: 7 TAB | Refills: 0 | Status: SHIPPED | OUTPATIENT
Start: 2017-01-01

## 2017-01-01 RX ORDER — SODIUM CHLORIDE 0.9 % (FLUSH) 0.9 %
5-10 SYRINGE (ML) INJECTION AS NEEDED
Status: DISCONTINUED | OUTPATIENT
Start: 2017-01-01 | End: 2017-01-01 | Stop reason: HOSPADM

## 2017-01-01 RX ORDER — INSULIN LISPRO 100 [IU]/ML
INJECTION, SOLUTION INTRAVENOUS; SUBCUTANEOUS EVERY 6 HOURS
Status: DISCONTINUED | OUTPATIENT
Start: 2017-01-01 | End: 2017-01-01

## 2017-01-01 RX ORDER — SCOLOPAMINE TRANSDERMAL SYSTEM 1 MG/1
1.5 PATCH, EXTENDED RELEASE TRANSDERMAL
Status: DISCONTINUED | OUTPATIENT
Start: 2017-01-01 | End: 2017-03-21 | Stop reason: HOSPADM

## 2017-01-01 RX ORDER — ONDANSETRON 2 MG/ML
4 INJECTION INTRAMUSCULAR; INTRAVENOUS
Status: DISCONTINUED | OUTPATIENT
Start: 2017-01-01 | End: 2017-03-21 | Stop reason: HOSPADM

## 2017-01-01 RX ORDER — MULTIVIT WITH MINERALS/HERBS
1 TABLET ORAL DAILY
Status: DISCONTINUED | OUTPATIENT
Start: 2017-01-01 | End: 2017-01-01

## 2017-01-01 RX ORDER — IPRATROPIUM BROMIDE AND ALBUTEROL SULFATE 2.5; .5 MG/3ML; MG/3ML
3 SOLUTION RESPIRATORY (INHALATION)
Status: DISCONTINUED | OUTPATIENT
Start: 2017-01-01 | End: 2017-01-01

## 2017-01-01 RX ORDER — NITROGLYCERIN 0.4 MG/1
0.4 TABLET SUBLINGUAL ONCE
Status: DISPENSED | OUTPATIENT
Start: 2017-01-01 | End: 2017-01-01

## 2017-01-01 RX ORDER — MORPHINE SULFATE 2 MG/ML
1 INJECTION, SOLUTION INTRAMUSCULAR; INTRAVENOUS
Status: DISCONTINUED | OUTPATIENT
Start: 2017-01-01 | End: 2017-03-21 | Stop reason: HOSPADM

## 2017-01-01 RX ORDER — NITROFURANTOIN (MACROCRYSTALS) 100 MG/1
100 CAPSULE ORAL
Status: ON HOLD | COMMUNITY
End: 2017-01-01

## 2017-01-01 RX ORDER — INSULIN LISPRO 100 [IU]/ML
20 INJECTION, SOLUTION INTRAVENOUS; SUBCUTANEOUS
Status: DISCONTINUED | OUTPATIENT
Start: 2017-01-01 | End: 2017-01-01

## 2017-01-01 RX ORDER — LANOLIN ALCOHOL/MO/W.PET/CERES
400 CREAM (GRAM) TOPICAL DAILY
Status: DISCONTINUED | OUTPATIENT
Start: 2017-01-01 | End: 2017-01-01 | Stop reason: HOSPADM

## 2017-01-01 RX ORDER — ENOXAPARIN SODIUM 100 MG/ML
30 INJECTION SUBCUTANEOUS EVERY 24 HOURS
Status: DISCONTINUED | OUTPATIENT
Start: 2017-01-01 | End: 2017-01-01

## 2017-01-01 RX ORDER — IBUPROFEN 600 MG/1
600 TABLET ORAL ONCE
Status: DISPENSED | OUTPATIENT
Start: 2017-01-01 | End: 2017-01-01

## 2017-01-01 RX ORDER — ARFORMOTEROL TARTRATE 15 UG/2ML
15 SOLUTION RESPIRATORY (INHALATION)
Status: DISCONTINUED | OUTPATIENT
Start: 2017-01-01 | End: 2017-01-01 | Stop reason: ALTCHOICE

## 2017-01-01 RX ORDER — FLUCONAZOLE 100 MG/1
200 TABLET ORAL
Status: DISCONTINUED | OUTPATIENT
Start: 2017-01-01 | End: 2017-01-01

## 2017-01-01 RX ORDER — ISOSORBIDE MONONITRATE 30 MG/1
15 TABLET, EXTENDED RELEASE ORAL DAILY
Status: DISPENSED | OUTPATIENT
Start: 2017-01-01 | End: 2017-01-01

## 2017-01-01 RX ORDER — ONDANSETRON 2 MG/ML
4 INJECTION INTRAMUSCULAR; INTRAVENOUS
Status: DISCONTINUED | OUTPATIENT
Start: 2017-01-01 | End: 2017-01-01

## 2017-01-01 RX ORDER — MORPHINE SULFATE 20 MG/ML
10 SOLUTION ORAL
Status: DISCONTINUED | OUTPATIENT
Start: 2017-01-01 | End: 2017-03-21 | Stop reason: HOSPADM

## 2017-01-01 RX ORDER — DOXYCYCLINE 100 MG/1
100 CAPSULE ORAL 2 TIMES DAILY
Qty: 14 CAP | Refills: 0 | Status: SHIPPED | OUTPATIENT
Start: 2017-01-01

## 2017-01-01 RX ORDER — CARVEDILOL 12.5 MG/1
12.5 TABLET ORAL 2 TIMES DAILY
Status: DISCONTINUED | OUTPATIENT
Start: 2017-01-01 | End: 2017-01-01

## 2017-01-01 RX ORDER — MAGNESIUM SULFATE HEPTAHYDRATE 40 MG/ML
2 INJECTION, SOLUTION INTRAVENOUS ONCE
Status: COMPLETED | OUTPATIENT
Start: 2017-01-01 | End: 2017-01-01

## 2017-01-01 RX ORDER — SODIUM CHLORIDE 0.9 % (FLUSH) 0.9 %
5-10 SYRINGE (ML) INJECTION AS NEEDED
Status: DISCONTINUED | OUTPATIENT
Start: 2017-01-01 | End: 2017-01-01

## 2017-01-01 RX ORDER — LOSARTAN POTASSIUM 50 MG/1
50 TABLET ORAL DAILY
Status: DISCONTINUED | OUTPATIENT
Start: 2017-01-01 | End: 2017-01-01

## 2017-01-01 RX ORDER — INSULIN LISPRO 100 [IU]/ML
INJECTION, SOLUTION INTRAVENOUS; SUBCUTANEOUS
Status: DISCONTINUED | OUTPATIENT
Start: 2017-01-01 | End: 2017-01-01 | Stop reason: HOSPADM

## 2017-01-01 RX ORDER — CIPROFLOXACIN 2 MG/ML
400 INJECTION, SOLUTION INTRAVENOUS
Status: COMPLETED | OUTPATIENT
Start: 2017-01-01 | End: 2017-01-01

## 2017-01-01 RX ORDER — PREDNISONE 20 MG/1
40 TABLET ORAL
Status: DISCONTINUED | OUTPATIENT
Start: 2017-01-01 | End: 2017-01-01

## 2017-01-01 RX ORDER — LEVOFLOXACIN 5 MG/ML
750 INJECTION, SOLUTION INTRAVENOUS EVERY 24 HOURS
Status: DISCONTINUED | OUTPATIENT
Start: 2017-01-01 | End: 2017-01-01

## 2017-01-01 RX ORDER — ISOSORBIDE MONONITRATE 30 MG/1
30 TABLET, EXTENDED RELEASE ORAL DAILY
Status: DISCONTINUED | OUTPATIENT
Start: 2017-01-01 | End: 2017-01-01

## 2017-01-01 RX ORDER — DEXTROSE 50 % IN WATER (D50W) INTRAVENOUS SYRINGE
25-50 AS NEEDED
Status: DISCONTINUED | OUTPATIENT
Start: 2017-01-01 | End: 2017-01-01

## 2017-01-01 RX ORDER — INSULIN GLARGINE 100 [IU]/ML
30 INJECTION, SOLUTION SUBCUTANEOUS
Status: DISCONTINUED | OUTPATIENT
Start: 2017-01-01 | End: 2017-01-01

## 2017-01-01 RX ORDER — KETOROLAC TROMETHAMINE 30 MG/ML
15 INJECTION, SOLUTION INTRAMUSCULAR; INTRAVENOUS
Status: COMPLETED | OUTPATIENT
Start: 2017-01-01 | End: 2017-01-01

## 2017-01-01 RX ORDER — INSULIN GLARGINE 100 [IU]/ML
40 INJECTION, SOLUTION SUBCUTANEOUS
Status: DISCONTINUED | OUTPATIENT
Start: 2017-01-01 | End: 2017-01-01

## 2017-01-01 RX ORDER — FLUCONAZOLE 2 MG/ML
100 INJECTION, SOLUTION INTRAVENOUS EVERY 24 HOURS
Status: COMPLETED | OUTPATIENT
Start: 2017-01-01 | End: 2017-01-01

## 2017-01-01 RX ORDER — INSULIN GLARGINE 100 [IU]/ML
50 INJECTION, SOLUTION SUBCUTANEOUS
Status: DISCONTINUED | OUTPATIENT
Start: 2017-01-01 | End: 2017-01-01

## 2017-01-01 RX ORDER — BUDESONIDE 0.5 MG/2ML
500 INHALANT ORAL
Status: DISCONTINUED | OUTPATIENT
Start: 2017-01-01 | End: 2017-01-01 | Stop reason: ALTCHOICE

## 2017-01-01 RX ORDER — OSELTAMIVIR PHOSPHATE 75 MG/1
75 CAPSULE ORAL 2 TIMES DAILY
Status: DISCONTINUED | OUTPATIENT
Start: 2017-01-01 | End: 2017-01-01

## 2017-01-01 RX ORDER — LEVOFLOXACIN 500 MG/1
500 TABLET, FILM COATED ORAL DAILY
Qty: 10 TAB | Refills: 0 | Status: SHIPPED | OUTPATIENT
Start: 2017-01-01 | End: 2017-01-01

## 2017-01-01 RX ORDER — ISOSORBIDE MONONITRATE 30 MG/1
30 TABLET, EXTENDED RELEASE ORAL DAILY
Qty: 2 TAB | Refills: 0 | Status: SHIPPED
Start: 2017-01-01

## 2017-01-01 RX ORDER — ISOSORBIDE MONONITRATE 30 MG/1
30 TABLET, EXTENDED RELEASE ORAL DAILY
Status: DISCONTINUED | OUTPATIENT
Start: 2017-01-01 | End: 2017-01-01 | Stop reason: HOSPADM

## 2017-01-01 RX ORDER — INSULIN GLARGINE 100 [IU]/ML
30 INJECTION, SOLUTION SUBCUTANEOUS DAILY
Status: DISCONTINUED | OUTPATIENT
Start: 2017-01-01 | End: 2017-01-01 | Stop reason: HOSPADM

## 2017-01-01 RX ORDER — NITROFURANTOIN (MACROCRYSTALS) 100 MG/1
100 CAPSULE ORAL
Qty: 7 CAP | Refills: 0 | Status: SHIPPED | OUTPATIENT
Start: 2017-01-01

## 2017-01-01 RX ORDER — LEVOFLOXACIN 500 MG/1
500 TABLET, FILM COATED ORAL EVERY 24 HOURS
Qty: 7 TAB | Refills: 0 | Status: SHIPPED | OUTPATIENT
Start: 2017-01-01 | End: 2017-01-01

## 2017-01-01 RX ORDER — HYALURONATE SODIUM 10 MG/ML
25 SYRINGE (ML) INTRAARTICULAR ONCE
COMMUNITY
End: 2017-01-01

## 2017-01-01 RX ORDER — LEVOFLOXACIN 500 MG/1
500 TABLET, FILM COATED ORAL EVERY 24 HOURS
Status: DISCONTINUED | OUTPATIENT
Start: 2017-01-01 | End: 2017-01-01 | Stop reason: HOSPADM

## 2017-01-01 RX ORDER — LEVOFLOXACIN 5 MG/ML
750 INJECTION, SOLUTION INTRAVENOUS EVERY 24 HOURS
Status: DISCONTINUED | OUTPATIENT
Start: 2017-01-01 | End: 2017-01-01 | Stop reason: DRUGHIGH

## 2017-01-01 RX ORDER — LEVOFLOXACIN 5 MG/ML
500 INJECTION, SOLUTION INTRAVENOUS EVERY 24 HOURS
Status: DISCONTINUED | OUTPATIENT
Start: 2017-01-01 | End: 2017-01-01

## 2017-01-01 RX ORDER — INSULIN LISPRO 100 [IU]/ML
7 INJECTION, SOLUTION INTRAVENOUS; SUBCUTANEOUS
Status: DISCONTINUED | OUTPATIENT
Start: 2017-01-01 | End: 2017-01-01 | Stop reason: HOSPADM

## 2017-01-01 RX ORDER — ALBUTEROL SULFATE 0.83 MG/ML
2.5 SOLUTION RESPIRATORY (INHALATION)
Status: COMPLETED | OUTPATIENT
Start: 2017-01-01 | End: 2017-01-01

## 2017-01-01 RX ORDER — CARVEDILOL 12.5 MG/1
25 TABLET ORAL 2 TIMES DAILY
Status: DISCONTINUED | OUTPATIENT
Start: 2017-01-01 | End: 2017-01-01 | Stop reason: HOSPADM

## 2017-01-01 RX ORDER — FUROSEMIDE 10 MG/ML
20 INJECTION INTRAMUSCULAR; INTRAVENOUS 2 TIMES DAILY
Status: DISCONTINUED | OUTPATIENT
Start: 2017-01-01 | End: 2017-03-21 | Stop reason: HOSPADM

## 2017-01-01 RX ORDER — INSULIN LISPRO 100 [IU]/ML
15 INJECTION, SOLUTION INTRAVENOUS; SUBCUTANEOUS
Status: DISCONTINUED | OUTPATIENT
Start: 2017-01-01 | End: 2017-01-01

## 2017-01-01 RX ORDER — TRAMADOL HYDROCHLORIDE 50 MG/1
50 TABLET ORAL
Status: COMPLETED | OUTPATIENT
Start: 2017-01-01 | End: 2017-01-01

## 2017-01-01 RX ORDER — NYSTATIN 100000 [USP'U]/G
POWDER TOPICAL 4 TIMES DAILY
Qty: 1 BOTTLE | Refills: 0 | Status: SHIPPED | OUTPATIENT
Start: 2017-01-01 | End: 2017-01-01

## 2017-01-01 RX ORDER — LEVOFLOXACIN 5 MG/ML
500 INJECTION, SOLUTION INTRAVENOUS EVERY 24 HOURS
Status: DISCONTINUED | OUTPATIENT
Start: 2017-01-01 | End: 2017-01-01 | Stop reason: DRUGHIGH

## 2017-01-01 RX ORDER — LANOLIN ALCOHOL/MO/W.PET/CERES
400 CREAM (GRAM) TOPICAL DAILY
Status: DISCONTINUED | OUTPATIENT
Start: 2017-01-01 | End: 2017-01-01

## 2017-01-01 RX ORDER — LEVOFLOXACIN 5 MG/ML
750 INJECTION, SOLUTION INTRAVENOUS
Status: DISCONTINUED | OUTPATIENT
Start: 2017-01-01 | End: 2017-01-01

## 2017-01-01 RX ORDER — ONDANSETRON 2 MG/ML
4 INJECTION INTRAMUSCULAR; INTRAVENOUS
Status: COMPLETED | OUTPATIENT
Start: 2017-01-01 | End: 2017-01-01

## 2017-01-01 RX ORDER — FUROSEMIDE 10 MG/ML
40 INJECTION INTRAMUSCULAR; INTRAVENOUS ONCE
Status: COMPLETED | OUTPATIENT
Start: 2017-01-01 | End: 2017-01-01

## 2017-01-01 RX ORDER — TRAMADOL HYDROCHLORIDE 50 MG/1
50 TABLET ORAL
Qty: 20 TAB | Refills: 0 | Status: SHIPPED | OUTPATIENT
Start: 2017-01-01 | End: 2017-01-01

## 2017-01-01 RX ORDER — LORAZEPAM 2 MG/ML
0.5 INJECTION INTRAMUSCULAR
Status: DISCONTINUED | OUTPATIENT
Start: 2017-01-01 | End: 2017-03-21 | Stop reason: HOSPADM

## 2017-01-01 RX ORDER — MULTIVIT WITH MINERALS/HERBS
1 TABLET ORAL DAILY
Status: DISCONTINUED | OUTPATIENT
Start: 2017-01-01 | End: 2017-01-01 | Stop reason: HOSPADM

## 2017-01-01 RX ORDER — MAGNESIUM SULFATE 100 %
4 CRYSTALS MISCELLANEOUS AS NEEDED
Status: DISCONTINUED | OUTPATIENT
Start: 2017-01-01 | End: 2017-01-01 | Stop reason: HOSPADM

## 2017-01-01 RX ORDER — MAGNESIUM SULFATE HEPTAHYDRATE 40 MG/ML
2 INJECTION, SOLUTION INTRAVENOUS
Status: COMPLETED | OUTPATIENT
Start: 2017-01-01 | End: 2017-01-01

## 2017-01-01 RX ORDER — FLUCONAZOLE 2 MG/ML
100 INJECTION, SOLUTION INTRAVENOUS ONCE
Status: DISPENSED | OUTPATIENT
Start: 2017-01-01 | End: 2017-01-01

## 2017-01-01 RX ORDER — INSULIN GLARGINE 100 [IU]/ML
20 INJECTION, SOLUTION SUBCUTANEOUS DAILY
Status: DISCONTINUED | OUTPATIENT
Start: 2017-01-01 | End: 2017-01-01

## 2017-01-01 RX ORDER — MAGNESIUM SULFATE 100 %
4 CRYSTALS MISCELLANEOUS AS NEEDED
Status: DISCONTINUED | OUTPATIENT
Start: 2017-01-01 | End: 2017-01-01

## 2017-01-01 RX ORDER — CLONAZEPAM 0.5 MG/1
0.5 TABLET ORAL
Status: DISCONTINUED | OUTPATIENT
Start: 2017-01-01 | End: 2017-01-01

## 2017-01-01 RX ORDER — HALOPERIDOL 2 MG/ML
2 SOLUTION ORAL
Status: DISCONTINUED | OUTPATIENT
Start: 2017-01-01 | End: 2017-03-21 | Stop reason: HOSPADM

## 2017-01-01 RX ORDER — DEXTROSE 50 % IN WATER (D50W) INTRAVENOUS SYRINGE
25-50 AS NEEDED
Status: DISCONTINUED | OUTPATIENT
Start: 2017-01-01 | End: 2017-01-01 | Stop reason: HOSPADM

## 2017-01-01 RX ORDER — INSULIN LISPRO 100 [IU]/ML
25 INJECTION, SOLUTION INTRAVENOUS; SUBCUTANEOUS
Status: DISCONTINUED | OUTPATIENT
Start: 2017-01-01 | End: 2017-01-01

## 2017-01-01 RX ADMIN — CARVEDILOL 25 MG: 12.5 TABLET, FILM COATED ORAL at 09:21

## 2017-01-01 RX ADMIN — FUROSEMIDE 20 MG: 10 INJECTION, SOLUTION INTRAMUSCULAR; INTRAVENOUS at 22:36

## 2017-01-01 RX ADMIN — AZTREONAM 2 G: 2 INJECTION, POWDER, LYOPHILIZED, FOR SOLUTION INTRAMUSCULAR; INTRAVENOUS at 10:16

## 2017-01-01 RX ADMIN — ONDANSETRON 4 MG: 2 INJECTION INTRAMUSCULAR; INTRAVENOUS at 12:05

## 2017-01-01 RX ADMIN — OSELTAMIVIR PHOSPHATE 75 MG: 75 CAPSULE ORAL at 22:42

## 2017-01-01 RX ADMIN — INSULIN GLARGINE 50 UNITS: 100 INJECTION, SOLUTION SUBCUTANEOUS at 23:07

## 2017-01-01 RX ADMIN — LEVOFLOXACIN 750 MG: 5 INJECTION, SOLUTION INTRAVENOUS at 08:59

## 2017-01-01 RX ADMIN — FUROSEMIDE 20 MG: 10 INJECTION, SOLUTION INTRAMUSCULAR; INTRAVENOUS at 09:46

## 2017-01-01 RX ADMIN — BUDESONIDE 500 MCG: 0.5 INHALANT RESPIRATORY (INHALATION) at 08:11

## 2017-01-01 RX ADMIN — ENOXAPARIN SODIUM 30 MG: 40 INJECTION SUBCUTANEOUS at 13:20

## 2017-01-01 RX ADMIN — LEVOFLOXACIN 750 MG: 5 INJECTION, SOLUTION INTRAVENOUS at 09:53

## 2017-01-01 RX ADMIN — SODIUM CHLORIDE 75 ML/HR: 900 INJECTION, SOLUTION INTRAVENOUS at 15:35

## 2017-01-01 RX ADMIN — OSELTAMIVIR PHOSPHATE 75 MG: 75 CAPSULE ORAL at 12:23

## 2017-01-01 RX ADMIN — ONDANSETRON 4 MG: 2 INJECTION INTRAMUSCULAR; INTRAVENOUS at 21:00

## 2017-01-01 RX ADMIN — METHYLPREDNISOLONE SODIUM SUCCINATE 40 MG: 125 INJECTION, POWDER, FOR SOLUTION INTRAMUSCULAR; INTRAVENOUS at 06:08

## 2017-01-01 RX ADMIN — Medication 1 TABLET: at 09:57

## 2017-01-01 RX ADMIN — Medication 0.5 MG: at 13:19

## 2017-01-01 RX ADMIN — AZTREONAM 2 G: 2 INJECTION, POWDER, LYOPHILIZED, FOR SOLUTION INTRAMUSCULAR; INTRAVENOUS at 18:28

## 2017-01-01 RX ADMIN — METHYLPREDNISOLONE SODIUM SUCCINATE 40 MG: 125 INJECTION, POWDER, FOR SOLUTION INTRAMUSCULAR; INTRAVENOUS at 14:04

## 2017-01-01 RX ADMIN — Medication 1 MG: at 20:12

## 2017-01-01 RX ADMIN — AZTREONAM 2 G: 2 INJECTION, POWDER, LYOPHILIZED, FOR SOLUTION INTRAMUSCULAR; INTRAVENOUS at 02:33

## 2017-01-01 RX ADMIN — LACTULOSE 10 G: 20 SOLUTION ORAL at 09:58

## 2017-01-01 RX ADMIN — MAGNESIUM SULFATE HEPTAHYDRATE 2 G: 40 INJECTION, SOLUTION INTRAVENOUS at 12:03

## 2017-01-01 RX ADMIN — INSULIN LISPRO 9 UNITS: 100 INJECTION, SOLUTION INTRAVENOUS; SUBCUTANEOUS at 07:00

## 2017-01-01 RX ADMIN — CARVEDILOL 12.5 MG: 12.5 TABLET, FILM COATED ORAL at 22:36

## 2017-01-01 RX ADMIN — CARVEDILOL 12.5 MG: 12.5 TABLET, FILM COATED ORAL at 23:08

## 2017-01-01 RX ADMIN — CARVEDILOL 12.5 MG: 12.5 TABLET, FILM COATED ORAL at 22:11

## 2017-01-01 RX ADMIN — AZTREONAM 2 G: 2 INJECTION, POWDER, LYOPHILIZED, FOR SOLUTION INTRAMUSCULAR; INTRAVENOUS at 11:02

## 2017-01-01 RX ADMIN — CARVEDILOL 25 MG: 12.5 TABLET, FILM COATED ORAL at 09:57

## 2017-01-01 RX ADMIN — INSULIN LISPRO 7 UNITS: 100 INJECTION, SOLUTION INTRAVENOUS; SUBCUTANEOUS at 12:44

## 2017-01-01 RX ADMIN — Medication 1 MG: at 23:08

## 2017-01-01 RX ADMIN — LACTULOSE 10 G: 20 SOLUTION ORAL at 09:32

## 2017-01-01 RX ADMIN — INSULIN LISPRO 20 UNITS: 100 INJECTION, SOLUTION INTRAVENOUS; SUBCUTANEOUS at 12:54

## 2017-01-01 RX ADMIN — Medication 1 TABLET: at 09:21

## 2017-01-01 RX ADMIN — INSULIN LISPRO 7 UNITS: 100 INJECTION, SOLUTION INTRAVENOUS; SUBCUTANEOUS at 09:56

## 2017-01-01 RX ADMIN — FUROSEMIDE 20 MG: 10 INJECTION, SOLUTION INTRAMUSCULAR; INTRAVENOUS at 10:16

## 2017-01-01 RX ADMIN — ENOXAPARIN SODIUM 30 MG: 40 INJECTION SUBCUTANEOUS at 12:34

## 2017-01-01 RX ADMIN — Medication 1 TABLET: at 09:51

## 2017-01-01 RX ADMIN — ARFORMOTEROL TARTRATE 15 MCG: 15 SOLUTION RESPIRATORY (INHALATION) at 21:14

## 2017-01-01 RX ADMIN — PANCRELIPASE 2 CAPSULE: 60000; 12000; 38000 CAPSULE, DELAYED RELEASE PELLETS ORAL at 18:12

## 2017-01-01 RX ADMIN — ALBUTEROL SULFATE 2.5 MG: 2.5 SOLUTION RESPIRATORY (INHALATION) at 11:28

## 2017-01-01 RX ADMIN — CARVEDILOL 12.5 MG: 12.5 TABLET, FILM COATED ORAL at 09:46

## 2017-01-01 RX ADMIN — SODIUM CHLORIDE 40 MG: 9 INJECTION INTRAMUSCULAR; INTRAVENOUS; SUBCUTANEOUS at 12:54

## 2017-01-01 RX ADMIN — AZTREONAM 2 G: 2 INJECTION, POWDER, LYOPHILIZED, FOR SOLUTION INTRAMUSCULAR; INTRAVENOUS at 18:02

## 2017-01-01 RX ADMIN — INSULIN GLARGINE 40 UNITS: 100 INJECTION, SOLUTION SUBCUTANEOUS at 22:28

## 2017-01-01 RX ADMIN — IPRATROPIUM BROMIDE AND ALBUTEROL SULFATE 3 ML: .5; 3 SOLUTION RESPIRATORY (INHALATION) at 08:04

## 2017-01-01 RX ADMIN — INSULIN LISPRO 8 UNITS: 100 INJECTION, SOLUTION INTRAVENOUS; SUBCUTANEOUS at 14:07

## 2017-01-01 RX ADMIN — VANCOMYCIN HYDROCHLORIDE 1750 MG: 10 INJECTION, POWDER, LYOPHILIZED, FOR SOLUTION INTRAVENOUS at 10:34

## 2017-01-01 RX ADMIN — ARFORMOTEROL TARTRATE 15 MCG: 15 SOLUTION RESPIRATORY (INHALATION) at 23:35

## 2017-01-01 RX ADMIN — ISOSORBIDE MONONITRATE 30 MG: 30 TABLET, EXTENDED RELEASE ORAL at 09:51

## 2017-01-01 RX ADMIN — IPRATROPIUM BROMIDE AND ALBUTEROL SULFATE 3 ML: .5; 3 SOLUTION RESPIRATORY (INHALATION) at 00:00

## 2017-01-01 RX ADMIN — ACETAMINOPHEN 0.4 MG: 400 TABLET ORAL at 09:51

## 2017-01-01 RX ADMIN — IBUPROFEN 600 MG: 600 TABLET ORAL at 22:46

## 2017-01-01 RX ADMIN — METHYLPREDNISOLONE SODIUM SUCCINATE 40 MG: 125 INJECTION, POWDER, FOR SOLUTION INTRAMUSCULAR; INTRAVENOUS at 06:43

## 2017-01-01 RX ADMIN — INSULIN LISPRO 4 UNITS: 100 INJECTION, SOLUTION INTRAVENOUS; SUBCUTANEOUS at 17:05

## 2017-01-01 RX ADMIN — FUROSEMIDE 20 MG: 10 INJECTION, SOLUTION INTRAMUSCULAR; INTRAVENOUS at 22:28

## 2017-01-01 RX ADMIN — ACETAMINOPHEN 0.4 MG: 400 TABLET ORAL at 09:21

## 2017-01-01 RX ADMIN — Medication 1 TABLET: at 09:45

## 2017-01-01 RX ADMIN — PANCRELIPASE 2 CAPSULE: 60000; 12000; 38000 CAPSULE, DELAYED RELEASE PELLETS ORAL at 11:52

## 2017-01-01 RX ADMIN — PANCRELIPASE 2 CAPSULE: 60000; 12000; 38000 CAPSULE, DELAYED RELEASE PELLETS ORAL at 16:36

## 2017-01-01 RX ADMIN — OSELTAMIVIR PHOSPHATE 75 MG: 75 CAPSULE ORAL at 10:03

## 2017-01-01 RX ADMIN — AZTREONAM 2 G: 2 INJECTION, POWDER, LYOPHILIZED, FOR SOLUTION INTRAMUSCULAR; INTRAVENOUS at 12:28

## 2017-01-01 RX ADMIN — KETOROLAC TROMETHAMINE 15 MG: 30 INJECTION, SOLUTION INTRAMUSCULAR at 11:38

## 2017-01-01 RX ADMIN — INSULIN LISPRO 3 UNITS: 100 INJECTION, SOLUTION INTRAVENOUS; SUBCUTANEOUS at 23:08

## 2017-01-01 RX ADMIN — METHYLPREDNISOLONE SODIUM SUCCINATE 40 MG: 125 INJECTION, POWDER, FOR SOLUTION INTRAMUSCULAR; INTRAVENOUS at 12:55

## 2017-01-01 RX ADMIN — IPRATROPIUM BROMIDE AND ALBUTEROL SULFATE 3 ML: .5; 3 SOLUTION RESPIRATORY (INHALATION) at 10:18

## 2017-01-01 RX ADMIN — INSULIN LISPRO 15 UNITS: 100 INJECTION, SOLUTION INTRAVENOUS; SUBCUTANEOUS at 09:20

## 2017-01-01 RX ADMIN — ARFORMOTEROL TARTRATE 15 MCG: 15 SOLUTION RESPIRATORY (INHALATION) at 08:11

## 2017-01-01 RX ADMIN — VANCOMYCIN HYDROCHLORIDE 1500 MG: 10 INJECTION, POWDER, LYOPHILIZED, FOR SOLUTION INTRAVENOUS at 13:24

## 2017-01-01 RX ADMIN — INSULIN GLARGINE 20 UNITS: 100 INJECTION, SOLUTION SUBCUTANEOUS at 09:21

## 2017-01-01 RX ADMIN — INSULIN LISPRO 20 UNITS: 100 INJECTION, SOLUTION INTRAVENOUS; SUBCUTANEOUS at 18:01

## 2017-01-01 RX ADMIN — PANCRELIPASE 2 CAPSULE: 10000; 34000; 55000 CAPSULE, DELAYED RELEASE ORAL at 10:01

## 2017-01-01 RX ADMIN — LEVOFLOXACIN 750 MG: 5 INJECTION, SOLUTION INTRAVENOUS at 10:18

## 2017-01-01 RX ADMIN — INSULIN LISPRO 7 UNITS: 100 INJECTION, SOLUTION INTRAVENOUS; SUBCUTANEOUS at 18:13

## 2017-01-01 RX ADMIN — FLUCONAZOLE 100 MG: 2 INJECTION INTRAVENOUS at 13:39

## 2017-01-01 RX ADMIN — BUDESONIDE 500 MCG: 0.5 INHALANT RESPIRATORY (INHALATION) at 23:35

## 2017-01-01 RX ADMIN — PANCRELIPASE 2 CAPSULE: 10000; 34000; 55000 CAPSULE, DELAYED RELEASE ORAL at 13:11

## 2017-01-01 RX ADMIN — CARVEDILOL 12.5 MG: 12.5 TABLET, FILM COATED ORAL at 22:29

## 2017-01-01 RX ADMIN — IPRATROPIUM BROMIDE AND ALBUTEROL SULFATE 3 ML: .5; 3 SOLUTION RESPIRATORY (INHALATION) at 04:14

## 2017-01-01 RX ADMIN — METHYLPREDNISOLONE SODIUM SUCCINATE 40 MG: 125 INJECTION, POWDER, FOR SOLUTION INTRAMUSCULAR; INTRAVENOUS at 18:03

## 2017-01-01 RX ADMIN — CARVEDILOL 25 MG: 12.5 TABLET, FILM COATED ORAL at 21:50

## 2017-01-01 RX ADMIN — IPRATROPIUM BROMIDE AND ALBUTEROL SULFATE 3 ML: .5; 3 SOLUTION RESPIRATORY (INHALATION) at 10:13

## 2017-01-01 RX ADMIN — INSULIN LISPRO 2 UNITS: 100 INJECTION, SOLUTION INTRAVENOUS; SUBCUTANEOUS at 06:50

## 2017-01-01 RX ADMIN — SODIUM CHLORIDE 75 ML/HR: 900 INJECTION, SOLUTION INTRAVENOUS at 14:08

## 2017-01-01 RX ADMIN — ACETAMINOPHEN 0.4 MG: 400 TABLET ORAL at 09:57

## 2017-01-01 RX ADMIN — LACTULOSE 10 G: 20 SOLUTION ORAL at 09:21

## 2017-01-01 RX ADMIN — LOSARTAN POTASSIUM 50 MG: 50 TABLET ORAL at 09:45

## 2017-01-01 RX ADMIN — INSULIN LISPRO 20 UNITS: 100 INJECTION, SOLUTION INTRAVENOUS; SUBCUTANEOUS at 08:31

## 2017-01-01 RX ADMIN — AZTREONAM 2 G: 2 INJECTION, POWDER, LYOPHILIZED, FOR SOLUTION INTRAMUSCULAR; INTRAVENOUS at 10:05

## 2017-01-01 RX ADMIN — BUDESONIDE 500 MCG: 0.5 INHALANT RESPIRATORY (INHALATION) at 21:14

## 2017-01-01 RX ADMIN — LEVOFLOXACIN 500 MG: 5 INJECTION, SOLUTION INTRAVENOUS at 09:33

## 2017-01-01 RX ADMIN — IOPAMIDOL 60 ML: 612 INJECTION, SOLUTION INTRAVENOUS at 10:54

## 2017-01-01 RX ADMIN — INSULIN LISPRO 6 UNITS: 100 INJECTION, SOLUTION INTRAVENOUS; SUBCUTANEOUS at 00:13

## 2017-01-01 RX ADMIN — PANCRELIPASE 2 CAPSULE: 60000; 12000; 38000 CAPSULE, DELAYED RELEASE PELLETS ORAL at 12:39

## 2017-01-01 RX ADMIN — Medication 1 MG: at 13:18

## 2017-01-01 RX ADMIN — PANCRELIPASE 2 CAPSULE: 60000; 12000; 38000 CAPSULE, DELAYED RELEASE PELLETS ORAL at 09:20

## 2017-01-01 RX ADMIN — ISOSORBIDE MONONITRATE 30 MG: 30 TABLET, EXTENDED RELEASE ORAL at 09:57

## 2017-01-01 RX ADMIN — CLONAZEPAM 0.5 MG: 0.5 TABLET ORAL at 18:00

## 2017-01-01 RX ADMIN — PANCRELIPASE 2 CAPSULE: 60000; 12000; 38000 CAPSULE, DELAYED RELEASE PELLETS ORAL at 09:57

## 2017-01-01 RX ADMIN — INSULIN LISPRO 8 UNITS: 100 INJECTION, SOLUTION INTRAVENOUS; SUBCUTANEOUS at 06:07

## 2017-01-01 RX ADMIN — AZTREONAM 2 G: 2 INJECTION, POWDER, LYOPHILIZED, FOR SOLUTION INTRAMUSCULAR; INTRAVENOUS at 10:26

## 2017-01-01 RX ADMIN — INSULIN LISPRO 4 UNITS: 100 INJECTION, SOLUTION INTRAVENOUS; SUBCUTANEOUS at 12:44

## 2017-01-01 RX ADMIN — AZTREONAM 2 G: 2 INJECTION, POWDER, LYOPHILIZED, FOR SOLUTION INTRAMUSCULAR; INTRAVENOUS at 03:03

## 2017-01-01 RX ADMIN — VANCOMYCIN HYDROCHLORIDE 1500 MG: 10 INJECTION, POWDER, LYOPHILIZED, FOR SOLUTION INTRAVENOUS at 15:10

## 2017-01-01 RX ADMIN — Medication 0.5 MG: at 23:19

## 2017-01-01 RX ADMIN — Medication 0.5 MG: at 15:09

## 2017-01-01 RX ADMIN — SODIUM CHLORIDE 2859 ML: 900 INJECTION, SOLUTION INTRAVENOUS at 11:00

## 2017-01-01 RX ADMIN — VANCOMYCIN HYDROCHLORIDE 1500 MG: 10 INJECTION, POWDER, LYOPHILIZED, FOR SOLUTION INTRAVENOUS at 12:53

## 2017-01-01 RX ADMIN — ACETAMINOPHEN 0.4 MG: 400 TABLET ORAL at 09:32

## 2017-01-01 RX ADMIN — METHYLPREDNISOLONE SODIUM SUCCINATE 40 MG: 125 INJECTION, POWDER, FOR SOLUTION INTRAMUSCULAR; INTRAVENOUS at 18:36

## 2017-01-01 RX ADMIN — Medication 0.5 MG: at 09:12

## 2017-01-01 RX ADMIN — VANCOMYCIN HYDROCHLORIDE 1500 MG: 10 INJECTION, POWDER, LYOPHILIZED, FOR SOLUTION INTRAVENOUS at 13:19

## 2017-01-01 RX ADMIN — OSELTAMIVIR PHOSPHATE 75 MG: 75 CAPSULE ORAL at 10:01

## 2017-01-01 RX ADMIN — PANCRELIPASE 2 CAPSULE: 60000; 12000; 38000 CAPSULE, DELAYED RELEASE PELLETS ORAL at 17:09

## 2017-01-01 RX ADMIN — FLUCONAZOLE 100 MG: 2 INJECTION INTRAVENOUS at 16:23

## 2017-01-01 RX ADMIN — LEVOFLOXACIN 750 MG: 5 INJECTION, SOLUTION INTRAVENOUS at 11:00

## 2017-01-01 RX ADMIN — LEVOFLOXACIN 500 MG: 5 INJECTION, SOLUTION INTRAVENOUS at 09:58

## 2017-01-01 RX ADMIN — METHYLPREDNISOLONE SODIUM SUCCINATE 40 MG: 125 INJECTION, POWDER, FOR SOLUTION INTRAMUSCULAR; INTRAVENOUS at 23:09

## 2017-01-01 RX ADMIN — INSULIN LISPRO 15 UNITS: 100 INJECTION, SOLUTION INTRAVENOUS; SUBCUTANEOUS at 14:31

## 2017-01-01 RX ADMIN — Medication 1 MG: at 11:01

## 2017-01-01 RX ADMIN — CIPROFLOXACIN 400 MG: 2 INJECTION, SOLUTION INTRAVENOUS at 00:05

## 2017-01-01 RX ADMIN — SODIUM CHLORIDE 2886 ML: 900 INJECTION, SOLUTION INTRAVENOUS at 07:50

## 2017-01-01 RX ADMIN — SODIUM CHLORIDE 40 MG: 9 INJECTION INTRAMUSCULAR; INTRAVENOUS; SUBCUTANEOUS at 13:20

## 2017-01-01 RX ADMIN — PANCRELIPASE 2 CAPSULE: 10000; 34000; 55000 CAPSULE, DELAYED RELEASE ORAL at 18:17

## 2017-01-01 RX ADMIN — AZTREONAM 2 G: 2 INJECTION, POWDER, LYOPHILIZED, FOR SOLUTION INTRAMUSCULAR; INTRAVENOUS at 18:36

## 2017-01-01 RX ADMIN — INSULIN GLARGINE 30 UNITS: 100 INJECTION, SOLUTION SUBCUTANEOUS at 09:57

## 2017-01-01 RX ADMIN — CARVEDILOL 25 MG: 12.5 TABLET, FILM COATED ORAL at 21:35

## 2017-01-01 RX ADMIN — OSELTAMIVIR PHOSPHATE 75 MG: 75 CAPSULE ORAL at 23:51

## 2017-01-01 RX ADMIN — AZTREONAM 2 G: 2 INJECTION, POWDER, LYOPHILIZED, FOR SOLUTION INTRAMUSCULAR; INTRAVENOUS at 03:53

## 2017-01-01 RX ADMIN — MAGNESIUM SULFATE IN WATER 2 G: 40 INJECTION, SOLUTION INTRAVENOUS at 17:00

## 2017-01-01 RX ADMIN — Medication 1 TABLET: at 09:32

## 2017-01-01 RX ADMIN — TRAMADOL HYDROCHLORIDE 50 MG: 50 TABLET, FILM COATED ORAL at 16:11

## 2017-01-01 RX ADMIN — INSULIN LISPRO 4 UNITS: 100 INJECTION, SOLUTION INTRAVENOUS; SUBCUTANEOUS at 17:24

## 2017-01-01 RX ADMIN — FLUCONAZOLE 100 MG: 2 INJECTION INTRAVENOUS at 15:35

## 2017-01-01 RX ADMIN — ONDANSETRON 4 MG: 2 INJECTION INTRAMUSCULAR; INTRAVENOUS at 07:49

## 2017-01-01 RX ADMIN — FUROSEMIDE 40 MG: 10 INJECTION, SOLUTION INTRAMUSCULAR; INTRAVENOUS at 12:45

## 2017-01-01 RX ADMIN — METHYLPREDNISOLONE SODIUM SUCCINATE 125 MG: 125 INJECTION, POWDER, FOR SOLUTION INTRAMUSCULAR; INTRAVENOUS at 11:10

## 2017-01-01 RX ADMIN — Medication 0.5 MG: at 14:31

## 2017-01-01 RX ADMIN — INSULIN LISPRO 10 UNITS: 100 INJECTION, SOLUTION INTRAVENOUS; SUBCUTANEOUS at 12:54

## 2017-01-01 RX ADMIN — LOSARTAN POTASSIUM 50 MG: 50 TABLET ORAL at 09:51

## 2017-01-01 RX ADMIN — PANCRELIPASE 2 CAPSULE: 10000; 34000; 55000 CAPSULE, DELAYED RELEASE ORAL at 10:04

## 2017-01-01 RX ADMIN — PANCRELIPASE 2 CAPSULE: 10000; 34000; 55000 CAPSULE, DELAYED RELEASE ORAL at 12:18

## 2017-01-01 RX ADMIN — Medication 0.5 MG: at 05:46

## 2017-01-01 RX ADMIN — Medication 1 MG: at 11:06

## 2017-01-01 RX ADMIN — INSULIN LISPRO 4 UNITS: 100 INJECTION, SOLUTION INTRAVENOUS; SUBCUTANEOUS at 22:11

## 2017-01-01 RX ADMIN — SODIUM CHLORIDE 40 MG: 9 INJECTION INTRAMUSCULAR; INTRAVENOUS; SUBCUTANEOUS at 14:09

## 2017-01-01 RX ADMIN — CARVEDILOL 12.5 MG: 12.5 TABLET, FILM COATED ORAL at 09:51

## 2017-01-01 RX ADMIN — INSULIN GLARGINE 20 UNITS: 100 INJECTION, SOLUTION SUBCUTANEOUS at 09:33

## 2017-01-01 RX ADMIN — ENOXAPARIN SODIUM 30 MG: 40 INJECTION SUBCUTANEOUS at 14:02

## 2017-01-01 RX ADMIN — PANCRELIPASE 2 CAPSULE: 60000; 12000; 38000 CAPSULE, DELAYED RELEASE PELLETS ORAL at 09:32

## 2017-01-01 RX ADMIN — ACETAMINOPHEN 0.4 MG: 400 TABLET ORAL at 09:45

## 2017-01-01 RX ADMIN — INSULIN LISPRO 6 UNITS: 100 INJECTION, SOLUTION INTRAVENOUS; SUBCUTANEOUS at 18:00

## 2017-01-01 RX ADMIN — LEVOFLOXACIN 500 MG: 5 INJECTION, SOLUTION INTRAVENOUS at 09:21

## 2017-01-01 RX ADMIN — MAGNESIUM SULFATE HEPTAHYDRATE 2 G: 40 INJECTION, SOLUTION INTRAVENOUS at 12:33

## 2017-01-01 RX ADMIN — CARVEDILOL 25 MG: 12.5 TABLET, FILM COATED ORAL at 09:32

## 2017-01-01 RX ADMIN — BUDESONIDE 500 MCG: 0.5 INHALANT RESPIRATORY (INHALATION) at 08:04

## 2017-01-01 RX ADMIN — LEVOFLOXACIN 750 MG: 5 INJECTION, SOLUTION INTRAVENOUS at 11:09

## 2017-01-01 RX ADMIN — INSULIN LISPRO 4 UNITS: 100 INJECTION, SOLUTION INTRAVENOUS; SUBCUTANEOUS at 11:52

## 2017-01-01 RX ADMIN — OSELTAMIVIR PHOSPHATE 75 MG: 75 CAPSULE ORAL at 22:29

## 2017-01-01 RX ADMIN — INSULIN LISPRO 2 UNITS: 100 INJECTION, SOLUTION INTRAVENOUS; SUBCUTANEOUS at 21:50

## 2017-01-01 RX ADMIN — ISOSORBIDE MONONITRATE 30 MG: 30 TABLET, EXTENDED RELEASE ORAL at 09:32

## 2017-01-01 RX ADMIN — SODIUM CHLORIDE 1000 ML: 900 INJECTION, SOLUTION INTRAVENOUS at 22:27

## 2017-01-01 RX ADMIN — FUROSEMIDE 20 MG: 10 INJECTION, SOLUTION INTRAMUSCULAR; INTRAVENOUS at 23:08

## 2017-01-01 RX ADMIN — Medication 0.5 MG: at 12:34

## 2017-01-01 RX ADMIN — PANCRELIPASE 2 CAPSULE: 60000; 12000; 38000 CAPSULE, DELAYED RELEASE PELLETS ORAL at 12:45

## 2017-01-01 RX ADMIN — INSULIN LISPRO 4 UNITS: 100 INJECTION, SOLUTION INTRAVENOUS; SUBCUTANEOUS at 12:40

## 2017-01-01 RX ADMIN — AZTREONAM 2 G: 2 INJECTION, POWDER, LYOPHILIZED, FOR SOLUTION INTRAMUSCULAR; INTRAVENOUS at 03:30

## 2017-01-01 RX ADMIN — ARFORMOTEROL TARTRATE 15 MCG: 15 SOLUTION RESPIRATORY (INHALATION) at 08:04

## 2017-01-01 RX ADMIN — FUROSEMIDE 20 MG: 10 INJECTION, SOLUTION INTRAMUSCULAR; INTRAVENOUS at 09:49

## 2017-01-01 RX ADMIN — METHYLPREDNISOLONE SODIUM SUCCINATE 40 MG: 125 INJECTION, POWDER, FOR SOLUTION INTRAMUSCULAR; INTRAVENOUS at 00:12

## 2017-01-01 RX ADMIN — AZTREONAM 2 G: 2 INJECTION, POWDER, LYOPHILIZED, FOR SOLUTION INTRAMUSCULAR; INTRAVENOUS at 20:48

## 2017-01-01 RX ADMIN — ENOXAPARIN SODIUM 30 MG: 40 INJECTION SUBCUTANEOUS at 12:53

## 2017-01-01 RX ADMIN — Medication 0.5 MG: at 18:20

## 2017-01-01 RX ADMIN — INSULIN LISPRO 9 UNITS: 100 INJECTION, SOLUTION INTRAVENOUS; SUBCUTANEOUS at 18:01

## 2017-01-01 RX ADMIN — INSULIN LISPRO 7 UNITS: 100 INJECTION, SOLUTION INTRAVENOUS; SUBCUTANEOUS at 12:43

## 2017-01-01 RX ADMIN — IPRATROPIUM BROMIDE AND ALBUTEROL SULFATE 3 ML: .5; 3 SOLUTION RESPIRATORY (INHALATION) at 20:11

## 2017-01-01 RX ADMIN — ISOSORBIDE MONONITRATE 30 MG: 30 TABLET, EXTENDED RELEASE ORAL at 09:45

## 2017-02-04 NOTE — ED TRIAGE NOTES
Urinary frequency and mild confusion since yesterday. Daughter states same thing happened in December and was admitted for \"bad kidney infection\". Denies pain or N/V/D. Sepsis Screening completed    (  )Patient meets SIRS criteria. ( xx )Patient does not meet SIRS criteria.       SIRS Criteria is achieved when two or more of the following are present   Temperature < 96.8°F (36°C) or > 100.9°F (38.3°C)   Heart Rate > 90 beats per minute   Respiratory Rate > 20 beats per minute   WBC count > 12,000 or <4,000 or > 10% bands

## 2017-02-04 NOTE — ED PROVIDER NOTES
HPI Comments: 10:38 PM    Tim Perez is a 67 y.o. female presenting to the ED via Daughter C/O confusion onset this afternoon. Daughter states that pt has also had urinary frequency. Pt has had previous UTI's (last UTI was two months ago) and daughter notes that the patient has started to show similar sx. Daughter became alarmed today when pt became slower to answer questions and trouble clothing herself. Pt has started to use Supartz for her arthritis. Blood sugar PTA was 258. Daughter suspects that she did not take her insulin today. Pt ambulates by walker and cane. Pt lives with her daughter. PMHx of osteoarthritis, PNA, arrhythmia, HTN, COPD, DM, liver disease and diarrhea. Pt denies diarrhea, constipation, chest pain, abdomen pain, nausea, vomiting, HA and any other Sx or complaints. Patient is a 67 y.o. female presenting with altered mental status. The history is provided by the patient. No  was used. Altered mental status    This is a new problem. The current episode started 3 to 5 hours ago (This afternoon). The problem has not changed since onset. Associated symptoms include confusion. Her past medical history is significant for diabetes, hypertension and COPD. Written by AISHA Jordan, as dictated by Leonardo Park MD    Past Medical History:   Diagnosis Date    Arrhythmia      pt reports a heart murmur    Chronic obstructive pulmonary disease ([address]      hx of pneumonia    Diabetes ([address] 2003    Diarrhea     Hypertension      takes B/P meds to protect kidneys    Liver disease      testing for cirrhosis    Osteoarthritis     Pneumonia 2010       Past Surgical History:   Procedure Laterality Date    Pr breast surgery procedure unlisted Bilateral      fibroid cysts    Hx cholecystectomy      Hx hysterectomy      Hx orthopaedic      Hx mohs procedure           History reviewed. No pertinent family history.     Social History     Social History    Marital status:      Spouse name: N/A    Number of children: N/A    Years of education: N/A     Occupational History    Not on file. Social History Main Topics    Smoking status: Never Smoker    Smokeless tobacco: Never Used    Alcohol use No    Drug use: No    Sexual activity: No     Other Topics Concern    Not on file     Social History Narrative         ALLERGIES: Darvon [propoxyphene]; Pcn [penicillins]; and Tylenol [acetaminophen]    Review of Systems   Gastrointestinal: Negative for abdominal pain, constipation, diarrhea, nausea and vomiting. Genitourinary: Positive for frequency. Neurological: Negative for headaches. Psychiatric/Behavioral: Positive for confusion. All other systems reviewed and are negative. Vitals:    02/03/17 2201   BP: 162/74   Pulse: 87   Resp: 18   Temp: 97.9 °F (36.6 °C)   SpO2: 98%   Weight: 96.2 kg (212 lb)   Height: 5' 5\" (1.651 m)            Physical Exam   Constitutional: She is oriented to person, place, and time. She appears well-developed and well-nourished. HENT:   Head: Normocephalic and atraumatic. Right Ear: External ear normal.   Left Ear: External ear normal.   Nose: Rhinorrhea present. Mouth/Throat: Oropharynx is clear and moist. Mucous membranes are dry. Abnormal dentition (Partially edentulous ). Nostrils: No erythema no edema   Eyes: Right eye exhibits no discharge. Left eye exhibits no discharge. No scleral icterus. Cataracts on left eye   Neck: Normal range of motion. Neck supple. No JVD present. No tracheal deviation present. Cardiovascular: Normal rate, regular rhythm, normal heart sounds and intact distal pulses. Pulmonary/Chest: Effort normal and breath sounds normal.   Abdominal: Soft. Bowel sounds are normal. She exhibits no distension. There is no tenderness. No HSM   Musculoskeletal: Normal range of motion. Neurological: She is alert and oriented to person, place, and time.  She has normal strength and normal reflexes. She displays normal reflexes. No cranial nerve deficit or sensory deficit. She exhibits normal muscle tone. Coordination normal.   No focal motor weakness. Skin: Skin is warm and dry. Ecchymosis noted. No rash noted. Multiple ecchymosis and scabs on arms,  chronic per family. Psychiatric: She has a normal mood and affect. Her behavior is normal.   Nursing note and vitals reviewed.      RESULTS:  X-RAY FINDINGS:  12:10 AM  Chest x-ray shows possible right lower lobe infiltrate with interstitial marking   Pending review [facility] Radiologist  Recorded by Yasmine Moore ED Scribconstance, as dictated by Reuben Leggett MD    XR CHEST PORT    (Results Pending)        Labs Reviewed   URINALYSIS W/ RFLX MICROSCOPIC - Abnormal; Notable for the following:        Result Value    Glucose 100 (*)     Blood TRACE (*)     Nitrites POSITIVE (*)     Leukocyte Esterase LARGE (*)     All other components within normal limits   CBC WITH AUTOMATED DIFF - Abnormal; Notable for the following:     RBC 4.17 (*)     RDW 14.6 (*)     PLATELET 462 (*)     LYMPHOCYTES 19 (*)     MONOCYTES 12 (*)     All other components within normal limits   METABOLIC PANEL, COMPREHENSIVE - Abnormal; Notable for the following:     Chloride 97 (*)     CO2 37 (*)     Glucose 301 (*)     BUN 22 (*)     GFR est AA 52 (*)     GFR est non-AA 43 (*)     All other components within normal limits   MAGNESIUM - Abnormal; Notable for the following:     Magnesium 1.3 (*)     All other components within normal limits   URINE MICROSCOPIC ONLY - Abnormal; Notable for the following:     WBC >100 (*)     Bacteria 3+ (*)     All other components within normal limits   GLUCOSE, POC - Abnormal; Notable for the following:     Glucose (POC) 246 (*)     All other components within normal limits   CARDIAC PANEL,(CK, CKMB & TROPONIN)   POC GLUCOSE       Recent Results (from the past 12 hour(s))   CBC WITH AUTOMATED DIFF    Collection Time: 02/03/17 10:21 PM Result Value Ref Range    WBC 5.2 4.6 - 13.2 K/uL    RBC 4.17 (L) 4.20 - 5.30 M/uL    HGB 12.8 12.0 - 16.0 g/dL    HCT 38.7 35.0 - 45.0 %    MCV 92.8 74.0 - 97.0 FL    MCH 30.7 24.0 - 34.0 PG    MCHC 33.1 31.0 - 37.0 g/dL    RDW 14.6 (H) 11.6 - 14.5 %    PLATELET 526 (L) 782 - 420 K/uL    MPV 10.7 9.2 - 11.8 FL    NEUTROPHILS 66 40 - 73 %    LYMPHOCYTES 19 (L) 21 - 52 %    MONOCYTES 12 (H) 3 - 10 %    EOSINOPHILS 2 0 - 5 %    BASOPHILS 1 0 - 2 %    ABS. NEUTROPHILS 3.4 1.8 - 8.0 K/UL    ABS. LYMPHOCYTES 1.0 0.9 - 3.6 K/UL    ABS. MONOCYTES 0.6 0.05 - 1.2 K/UL    ABS. EOSINOPHILS 0.1 0.0 - 0.4 K/UL    ABS. BASOPHILS 0.0 0.0 - 0.06 K/UL    DF AUTOMATED     METABOLIC PANEL, COMPREHENSIVE    Collection Time: 02/03/17 10:21 PM   Result Value Ref Range    Sodium 141 136 - 145 mmol/L    Potassium 3.5 3.5 - 5.5 mmol/L    Chloride 97 (L) 100 - 108 mmol/L    CO2 37 (H) 21 - 32 mmol/L    Anion gap 7 3.0 - 18 mmol/L    Glucose 301 (H) 74 - 99 mg/dL    BUN 22 (H) 7.0 - 18 MG/DL    Creatinine 1.22 0.6 - 1.3 MG/DL    BUN/Creatinine ratio 18 12 - 20      GFR est AA 52 (L) >60 ml/min/1.73m2    GFR est non-AA 43 (L) >60 ml/min/1.73m2    Calcium 9.6 8.5 - 10.1 MG/DL    Bilirubin, total 1.0 0.2 - 1.0 MG/DL    ALT (SGPT) 29 13 - 56 U/L    AST (SGOT) 37 15 - 37 U/L    Alk.  phosphatase 74 45 - 117 U/L    Protein, total 7.1 6.4 - 8.2 g/dL    Albumin 3.8 3.4 - 5.0 g/dL    Globulin 3.3 2.0 - 4.0 g/dL    A-G Ratio 1.2 0.8 - 1.7     MAGNESIUM    Collection Time: 02/03/17 10:21 PM   Result Value Ref Range    Magnesium 1.3 (L) 1.8 - 2.4 mg/dL   CARDIAC PANEL,(CK, CKMB & TROPONIN)    Collection Time: 02/03/17 10:21 PM   Result Value Ref Range    CK 84 26 - 192 U/L    CK - MB 0.9 0.5 - 3.6 ng/ml    CK-MB Index 1.1 0.0 - 4.0 %    Troponin-I, Qt. 0.03 0.00 - 0.06 NG/ML   URINALYSIS W/ RFLX MICROSCOPIC    Collection Time: 02/03/17 11:17 PM   Result Value Ref Range    Color YELLOW      Appearance CLOUDY      Specific gravity 1.017 1.005 - 1.030 pH (UA) 7.0 5.0 - 8.0      Protein NEGATIVE  NEG mg/dL    Glucose 100 (A) NEG mg/dL    Ketone NEGATIVE  NEG mg/dL    Bilirubin NEGATIVE  NEG      Blood TRACE (A) NEG      Urobilinogen 1.0 0.2 - 1.0 EU/dL    Nitrites POSITIVE (A) NEG      Leukocyte Esterase LARGE (A) NEG     URINE MICROSCOPIC ONLY    Collection Time: 02/03/17 11:17 PM   Result Value Ref Range    WBC >100 (H) 0 - 5 /hpf    RBC 10 to 12 0 - 5 /hpf    Epithelial cells 12 to 14 0 - 5 /lpf    Bacteria 3+ (A) NEG /hpf   GLUCOSE, POC    Collection Time: 02/04/17 12:09 AM   Result Value Ref Range    Glucose (POC) 246 (H) 70 - 110 mg/dL         MDM  Number of Diagnoses or Management Options  Diagnosis management comments: DDx: UTI, dehydration, anemia, R/o electrolyte disorder, ACS, arrhythmia, medication effect. Doubt acute intracranial process       Amount and/or Complexity of Data Reviewed  Clinical lab tests: reviewed and ordered (Comprehensive metabolic panel, CBC, Urinalysis w/ RFLX microscopic, Cardiac panel, Magnesium)  Tests in the radiology section of CPT®: reviewed and ordered (Chest x-ray)  Independent visualization of images, tracings, or specimens: yes (Chest x-ray)      ED Course     Medications   ciprofloxacin (CIPRO) 400 mg IVPB (premix) (400 mg IntraVENous New Bag 2/4/17 0005)   sodium chloride 0.9 % bolus infusion 1,000 mL (1,000 mL IntraVENous New Bag 2/3/17 2227)      Procedures  PROGRESS NOTE:  10:38 PM  Initial assessment performed. Written by AISHA Chau, as dictated by Caroline Vasquez MD     PROGRESS NOTE:   12:34 AM  Pt is feeling better and thankful for care. Pt is not in respiratory distress. Heart normal , Abdomen soft. Alert and oriented x 3 with no focal neuro deficits. Written by AISHA Chau, as dictated by Caroline Vasquez MD .      DISCHARGE NOTE:  12:36 AM   Adán [place]  results have been reviewed with her. She has been counseled regarding her diagnosis, treatment, and plan.   She verbally conveys understanding and agreement of the signs, symptoms, diagnosis, treatment and prognosis and additionally agrees to follow up as discussed. She also agrees with the care-plan and conveys that all of her questions have been answered. I have also provided discharge instructions for her that include: educational information regarding their diagnosis and treatment, and list of reasons why they would want to return to the ED prior to their follow-up appointment, should her condition change. The patient and/or family has been provided with education for proper Emergency Department utilization. CLINICAL IMPRESSION:    1. Urinary tract infection, site unspecified    2. Transient confusion    3. Dehydration    4. Infiltrate noted on imaging study        PLAN: DISCHARGE HOME    Follow-up Information     Follow up With Details Comments Jaquelin Carrion MD Schedule an appointment as soon as possible for a visit in 2 days Follow up with your primary care physician [id number] [place] [phone]      [facility] EMERGENCY DEPT Go to As needed, If symptoms worsen [address] [place] [id number]  [phone]          Current Discharge Medication List      CONTINUE these medications which have CHANGED    Details   levoFLOXacin (LEVAQUIN) 500 mg tablet Take 1 Tab by mouth daily. Qty: 10 Tab, Refills: 0         CONTINUE these medications which have NOT CHANGED    Details   sodium hyaluronate (SUPARTZ) 10 mg/mL syrg injection 25 mg by Intra artICUlar route once. b complex vitamins tablet Take 1 Tab by mouth daily. insulin aspart (NOVOLOG FLEXPEN) 100 unit/mL inpn 15 Units by SubCUTAneous route Before breakfast, lunch, and dinner. diphenoxylate-atropine (LOMOTIL) 2.5-0.025 mg per tablet Take 1 Tab by mouth four (4) times daily as needed for Diarrhea.      hydroCHLOROthiazide (HYDRODIURIL) 25 mg tablet Take 25 mg by mouth daily.       folic acid 963 mcg tablet Take 400 mcg by mouth daily. !! insulin glargine (LANTUS SOLOSTAR) 100 unit/mL (3 mL) pen 40 Units by SubCUTAneous route every morning. !! insulin glargine (LANTUS SOLOSTAR) 100 unit/mL (3 mL) pen 15 Units by SubCUTAneous route Daily (before dinner). lactulose 10 gram/15 mL (15 mL) soln Take 15 mL by mouth daily. Qty: 500 mL, Refills: 3    Associated Diagnoses: Cryptogenic cirrhosis (HCC)      celecoxib (CELEBREX) 100 mg capsule Take 100 mg by mouth two (2) times a day. escitalopram oxalate (LEXAPRO) 10 mg tablet Take 10 mg by mouth daily. lipase-protease-amylase (ZENPEP) capsule Take 2 Caps by mouth three (3) times daily (with meals). Strength 10,000 units capsule  Indications: bowel leakage       !! - Potential duplicate medications found. Please discuss with provider. ATTESTATIONS:  This note is prepared by Chad Evans, acting as Scribe for Nika Marte MD .    Nika Marte MD : The scribe's documentation has been prepared under my direction and personally reviewed by me in its entirety. I confirm that the note above accurately reflects all work, treatment, procedures, and medical decision making performed by me.

## 2017-02-04 NOTE — ED NOTES
I have reviewed discharge instructions with the patient and caregiver. The patient and caregiver verbalized understanding. Patient armband removed and shredded. caregiver given 1 prescriptions. Discussed side effects with caregiver. caregiver verbalized understanding. Patient wheeled to lobby by caregiver. Patient discharged in stable condition to care of caregiver.

## 2017-02-04 NOTE — ED NOTES
Patient resting comfortably. Patient's daughter at bedside. Side rails up x2 for safety. Call bell within reach.

## 2017-02-04 NOTE — DISCHARGE INSTRUCTIONS
Urinary Tract Infection in Women: Care Instructions  Your Care Instructions    A urinary tract infection, or UTI, is a general term for an infection anywhere between the kidneys and the urethra (where urine comes out). Most UTIs are bladder infections. They often cause pain or burning when you urinate. UTIs are caused by bacteria and can be cured with antibiotics. Be sure to complete your treatment so that the infection goes away. Follow-up care is a key part of your treatment and safety. Be sure to make and go to all appointments, and call your doctor if you are having problems. It's also a good idea to know your test results and keep a list of the medicines you take. How can you care for yourself at home? · Take your antibiotics as directed. Do not stop taking them just because you feel better. You need to take the full course of antibiotics. · Drink extra water and other fluids for the next day or two. This may help wash out the bacteria that are causing the infection. (If you have kidney, heart, or liver disease and have to limit fluids, talk with your doctor before you increase your fluid intake.)  · Avoid drinks that are carbonated or have caffeine. They can irritate the bladder. · Urinate often. Try to empty your bladder each time. · To relieve pain, take a hot bath or lay a heating pad set on low over your lower belly or genital area. Never go to sleep with a heating pad in place. To prevent UTIs  · Drink plenty of water each day. This helps you urinate often, which clears bacteria from your system. (If you have kidney, heart, or liver disease and have to limit fluids, talk with your doctor before you increase your fluid intake.)  · Consider adding cranberry juice to your diet. · Urinate when you need to. · Urinate right after you have sex. · Change sanitary pads often. · Avoid douches, bubble baths, feminine hygiene sprays, and other feminine hygiene products that have deodorants.   · After going to the bathroom, wipe from front to back. When should you call for help? Call your doctor now or seek immediate medical care if:  · Symptoms such as fever, chills, nausea, or vomiting get worse or appear for the first time. · You have new pain in your back just below your rib cage. This is called flank pain. · There is new blood or pus in your urine. · You have any problems with your antibiotic medicine. Watch closely for changes in your health, and be sure to contact your doctor if:  · You are not getting better after taking an antibiotic for 2 days. · Your symptoms go away but then come back. Where can you learn more? Go to http://gissell-mony.info/. Enter B880 in the search box to learn more about \"Urinary Tract Infection in Women: Care Instructions. \"  Current as of: August 12, 2016  Content Version: 11.1  © 3237-4309 [employer]. Care instructions adapted under license by [employer]. (which disclaims liability or warranty for this information). If you have questions about a medical condition or this instruction, always ask your healthcare professional. [address] any warranty or liability for your use of this information.

## 2017-02-09 NOTE — Clinical Note
SPECIAL DISCHARGE INSTRUCTIONS AND COMMENTS: 
 
General comments: Thank you for allowing us to provide you with excellent care today. We hope we addressed all of your concerns and needs. We strive to provide excellent quality care in the Emergency Depart ment. If you feel that you have not received excellent quality care or timely care, please ask to speak to the nurse manager. Please choose us in the future for your continued health care needs. Follow-up comments: The exam and treatment you rece ived in the Emergency Department were for an urgent problem that may be new for you and / or one which may be related to a worsening of a chronic or ongoing medical problem that you already had prior to this visit. In any case, today's treatment is not i ntended to be considered as complete care in all cases and thus, it is important that you follow-up with a doctor, nurse practitioner, or physicians assistant to: (1) confirm your diagnosis, (2) re-evaluation of changes in your illness and treatment, an d (3) for ongoing care. In some cases we may have contacted your doctor or a specific specialist who may be involved in your future evaluation and care but in any case, take this sheet with you when you go to your follow-up visit or refer to the infor stephie on these sheets when you are calling to arrange an appointment - it may prove helpful in making the appointment. Prescribed Medications: Unless you have been directed by the provider to change your current medicines, you should continue to brittny e them as before. If you have been prescribed medicines, please take them as directed. In some cases, these new medicines are intended to replace a medicine that you are currently taking and if so, this will be noted below.  
 
 Our expectation is that y our condition will improve by following the doctor's recommendations, however, if in the event your condition worsens or does not improve as expected, please follow-up with your PCP or if unable to reach your usual health care provider, you should return  to the Emergency Department. We are available 24 hours a day.   
 
SPECIFIC INSTRUCTIONS PROVIDED BY YOUR DOCTOR, Kenna Lakhani MD:

## 2017-02-09 NOTE — ED PROVIDER NOTES
HPI Comments:   1:47 PM  67 y.o. female presents to ED via EMS c/o right hip pain s/p sliding out of bed and falling onto her right side onset this AM. Associated symptoms include right sided pelvic pain. Per daughter, pt was walking in to Dr. Jaci Brady office 2 days ago to be checked for chronic bilateral knee pain when she tripped and fell onto her right side. Pt had an XR of her right hip completed by Dr. Jaci Brady, and due to concern for possible hairline fx, pt was sent to [facility] for an MRI of her right hip, which showed no fracture. DARON this AM is that pt had slid out of bed and fallen onto her right side again, worsening the pain in her right hip. Pt denies head injury, LOC, new knee pain, abdominal pain, HA, and any other Sx or complaints. Patient is a 67 y.o. female presenting with fall. The history is provided by the patient. No  was used. Fall   Incident onset: today. Fall occurred: sitting. She fell from a height of ground level. The point of impact was the right hip. The pain is present in the right hip. Pertinent negatives include no fever, no abdominal pain, no nausea, no vomiting and no headaches. Past Medical History:   Diagnosis Date    Arrhythmia      pt reports a heart murmur    Chronic obstructive pulmonary disease (HCC)      hx of pneumonia    Diabetes ([facility] [address] 2003    Diarrhea     Hypertension      takes B/P meds to protect kidneys    Liver disease      testing for cirrhosis    Osteoarthritis     Pneumonia 2010       Past Surgical History:   Procedure Laterality Date    Pr breast surgery procedure unlisted Bilateral      fibroid cysts    Hx cholecystectomy      Hx hysterectomy      Hx orthopaedic      Hx mohs procedure           History reviewed. No pertinent family history. Social History     Social History    Marital status:      Spouse name: N/A    Number of children: N/A    Years of education: N/A     Occupational History    Not on file. Social History Main Topics    Smoking status: Never Smoker    Smokeless tobacco: Never Used    Alcohol use No    Drug use: No    Sexual activity: No     Other Topics Concern    Not on file     Social History Narrative         ALLERGIES: Darvon [propoxyphene]; Pcn [penicillins]; and Tylenol [acetaminophen]    Review of Systems   Constitutional: Negative for appetite change, chills and fever. HENT: Negative for congestion, mouth sores, rhinorrhea and sore throat. Eyes: Negative for pain. Respiratory: Negative for cough, chest tightness, shortness of breath and wheezing. Cardiovascular: Positive for syncope. Negative for chest pain and leg swelling. Gastrointestinal: Negative for abdominal pain, diarrhea, nausea and vomiting. Genitourinary: Positive for pelvic pain. Negative for difficulty urinating, dysuria and urgency. Musculoskeletal: Positive for arthralgias (right hip, bilateral knees). Negative for myalgias. Neurological: Negative for weakness and headaches. All other systems reviewed and are negative. Vitals:    02/09/17 1344 02/09/17 1445 02/09/17 1612   BP: 147/69 135/66 132/78   Pulse: 76 78 75   Resp: 16 16 16   Temp: 98.6 °F (37 °C) 98 °F (36.7 °C) 98 °F (36.7 °C)   SpO2: 97% 97% 97%   Weight: 96.2 kg (212 lb)     Height: 5' 5\" (1.651 m)              Physical Exam   Nursing note and vitals reviewed. -------------------------PHYSICAL EXAM-------------------------    Vital signs and nursing notes reviewed  Nursing note and VS were reviewed      CONSTITUTIONAL: Well developed, well nourished and appears adequately hydrated. Chronically ill appearing. Awake and alert. Non-toxic in appearance, not diaphoretic. Afebrile. HEAD: Normocephalic, Atraumatic. EYES: Pupils are equal, round and reactive. Extra-ocular movements intact. Sclera anicteric. Conjunctiva not injected.      ENT: Mucous membranes are moist. There is no erythema or swelling of the mucosal tissues or enlargement of the tonsillar tissue or the presence of exudates. No oral lesions or thrush. The left TM is unremarkable. Both EAC's are without swelling or erythema. Both TM's unremarkable. Nasal mucosa pink with no discharge and the turbinates are of normal size. NECK: Normal ROM. Neck is supple. No posterior cervical paraspinal or midline tenderness. No obvious enlargement of the thyroid. No significant anterior cervical adenopathy. Trachea is midline. CARDIOVASCULAR:  grade 2 SCM. No rubs or gallops. Distal pulses are 2+ and equal.    PULMONARY: Respiratory effort is normal and without the use of accessory muscles. Patient is speaking in full sentences. Clear to auscultation bilaterally. No wheezing, rales or rhonchi. CHEST WALL: Normal shape;  mild anterior tenderness which is chronic in nature; no crepitus    ABDOMINAL: Soft, obese, and non-distended. No tenderness. NO peritoneal signs - No rebound, guarding or rigidity. Active bowel sounds present. Pelvis is stable. BACK: No thoracolumbar midline or paraspinal tenderness. Full range of motion. No CVA tenderness. MUSCULOSKELETAL: Tenderness in left hip with ROM. Pain and tenderness in right hip with limited rotation. Right knee is markedly swollen chronically in nature with some crepitus. Left knee has minimal swelling with some tenderness laterally. No peripheral edema. SKIN: Skin is warm and dry. Good skin turgor. No diaphoresis, lesions,  Rashes, or petechiae. Cap Refill is Normal.    NEUROLOGICAL: Alert, awake and appropriately oriented. Normal speech. CN's are normal;  Motor - no focal weakness; no obvious sensory loss; Cerebellar function- intact; DTR's - 2+ equal    PSYCH: Appropriate affect; normal thought content; no expressed suicidal ideation.        MDM  Number of Diagnoses or Management Options  Contusion of right hip, initial encounter:   Fall, initial encounter:   Primary osteoarthritis of right knee:   Diagnosis management comments: INITIAL CLINICAL IMPRESSION and PLANS:  The patient presents with the primary complaint(s) of: slid out of bed with injury to her pelvis. The presentation, to include historical aspects and clinical findings appear to be consistent with the DX of soft tissue contusion. However, other possible DX's to consider as primary, associated with, or exacerbated by include:    1. Hip fxs  2. Spinal compression fxs    Considering the above, my initial management plan to evaluate and therapeutic interventions include: Obtain Radiologic studies,  As well as those noted in the orders:    Tera Ji MD        Amount and/or Complexity of Data Reviewed  Tests in the radiology section of CPT®: ordered and reviewed (XR Pelvis, XR Right knee, XR Left knee)  Independent visualization of images, tracings, or specimens: yes (XR Pelvis, XR Right knee, XR Left knee)      ED Course       Procedures    ED CLINICAL SUMMARY - DISCHARGE     2:09 PM  CLINICAL COURSE while in the ED:      Intervention)s) while in ED:  ACTIONS / APPROACH: Based on the presenting ACUTE history of right hip pain. My initial focus was to Determine the cause and extent of the problem, Initiate Treatment as Appropriate and Determine the presence of associated injuries . Details of actions taken are noted below. SPECIFICS REGARDING APPROACH:     1. DIAGNOSTIC RESULTS:     PULSE OXIMETRY NOTE:  2:45 PM   Pulse-ox is 97% on room air  Interpretation: normal  Intervention: none   Written by Deonna Sunshine, ED Scribe, as dictated by Sandra Parra MD     XR KNEE RT MAX 2 VWS   Final Result  IMPRESSION:  Osteoarthritis, advanced at the medial joint compartment. Moderate joint effusion. Chondrocalcinosis. No fracture or subluxation. As read by the radiologist.       XR PELV 1 OR 2 V   Final Result  IMPRESSION:  1. No acute osseous abnormality involving the pelvis.   As read by the radiologist.       XR KNEE LT MAX 2 VWS   Final Result IMPRESSION:  1. No acute osseous abnormality, with mild tricompartmental joint space loss as above. As read by the radiologist.           Labs Reviewed - No data to display      No results found for this or any previous visit (from the past 12 hour(s)). 2. MEDICATIONS GIVEN:   Medications   traMADol (ULTRAM) tablet 50 mg (50 mg Oral Given 2/9/17 1611)        Response to Intervention(s):   IMPROVED       Unanticipated Developments: NONE     ED COURSE - General Comment:  During the ED course I had re-evaluated the patient, answered their and /or their family's questions regarding my clinical impression, the patient's condition and plans for therapeutic interventions. The patient's ED course was uneventful and remained stable throughout. CLINICAL IMPRESSION AND DISCUSSION:   I reviewed our electronic medical record system for any past medical records that were available that may contribute to the patients current condition, the nursing notes and vital signs from today's visit. Based on the clinical presentation, findings and results of diagnostic studies, as well as developments while in the ED,  I suspect the following: For the presentation noted above    The most likely cause or diagnosis is: soft tissue contusions. DISCUSSION REGARDING DIAGNOSIS: The specifics regarding my clinical impression / diagnosis are as follows: At this time there is no clinical evidence to support other pertinent diagnostic considerations such as:   N/A    Finally, other diagnostic considerations during this visit are noted below in Clinical Impression. Specific Conversations:  NONE      DISPOSITION DECISION:  Have contacted case management to help coordinate home PT. Patient is scheduled to see Dr Little Lynch tomorrow.     DISCHARGE: I feel that we have optimized outpatient assessment and management such that Casper Ruano is stable to be discharged and to continue with her care or complete any additional evaluation as appropriate at home or as an outpatient. Preparations will be made to discharge the patient. Present condition at the time of disposition: STABLE    DISCUSSION REGARDING THE DISPOSITION:         DISCHARGE NOTE:  3:50 PM  Paola Westfall's  results have been reviewed with her. She has been counseled regarding her diagnosis, treatment, and plan. She verbally conveys understanding and agreement of the signs, symptoms, diagnosis, treatment and prognosis and additionally agrees to follow up as discussed. She also agrees with the care-plan and conveys that all of her questions have been answered. I have also provided discharge instructions for her that include: educational information regarding their diagnosis and treatment, and list of reasons why they would want to return to the ED prior to their follow-up appointment, should her condition change. The patient and/or family has been provided with education for proper Emergency Department utilization. CLINICAL IMPRESSION  1. Fall, initial encounter    2. Contusion of right hip, initial encounter    3. Primary osteoarthritis of right knee        PLAN:  1. D/C home  2. Patient's Medications   Start Taking    TRAMADOL (ULTRAM) 50 MG TABLET    Take 1 Tab by mouth every six (6) hours as needed for Pain for up to 10 days. Max Daily Amount: 200 mg. Continue Taking    B COMPLEX VITAMINS TABLET    Take 1 Tab by mouth daily. CELECOXIB (CELEBREX) 100 MG CAPSULE    Take 100 mg by mouth two (2) times a day. DIPHENOXYLATE-ATROPINE (LOMOTIL) 2.5-0.025 MG PER TABLET    Take 1 Tab by mouth four (4) times daily as needed for Diarrhea. ESCITALOPRAM OXALATE (LEXAPRO) 10 MG TABLET    Take 10 mg by mouth daily. FOLIC ACID 177 MCG TABLET    Take 400 mcg by mouth daily. HYDROCHLOROTHIAZIDE (HYDRODIURIL) 25 MG TABLET    Take 25 mg by mouth daily.     INSULIN ASPART (NOVOLOG FLEXPEN) 100 UNIT/ML INPN    15 Units by SubCUTAneous route Before breakfast, lunch, and dinner. INSULIN GLARGINE (LANTUS SOLOSTAR) 100 UNIT/ML (3 ML) PEN    40 Units by SubCUTAneous route every morning. INSULIN GLARGINE (LANTUS SOLOSTAR) 100 UNIT/ML (3 ML) PEN    15 Units by SubCUTAneous route Daily (before dinner). LACTULOSE 10 GRAM/15 ML (15 ML) SOLN    Take 15 mL by mouth daily. LEVOFLOXACIN (LEVAQUIN) 500 MG TABLET    Take 1 Tab by mouth daily. LIPASE-PROTEASE-AMYLASE (ZENPEP) CAPSULE    Take 2 Caps by mouth three (3) times daily (with meals). Strength 10,000 units capsule  Indications: bowel leakage    SODIUM HYALURONATE (SUPARTZ) 10 MG/ML SYRG INJECTION    25 mg by Intra artICUlar route once. These Medications have changed    No medications on file   Stop Taking    No medications on file     3. Follow-up Information     Follow up With Details Comments Jaquelin Carrion MD Call in 2 days For follow up with your PCP. [address] [place] [facility] [address]  [phone]      [facility] EMERGENCY DEPT Go to As needed, If symptoms worsen. [address] [facility] [address] [facility]  [phone]        Return if sxs worsen    ATTESTATIONS:  This note is prepared by Deonna Sunshine, acting as Scribe for Sandra Parra MD.    Sandra Parra MD: The scribe's documentation has been prepared under my direction and personally reviewed by me in its entirety. I confirm that the note above accurately reflects all work, treatment, procedures, and medical decision making performed by me.

## 2017-02-09 NOTE — PROGRESS NOTES
Consult received, spoke with MD. Pt brought to ED after fall, pt with recent falls, will follow up with Dr. Olivia Osorio tomorrow. CM met with pt and daughter in ED, explained Medicare requirements for SNF, pt has [place] Medicaid, CM suggested that daughter contact Harinder Abbott  for screening for LTC, services can be either home based or facility based if daughter would like pt to remain in home. Offered [facility] PT/OT for follow up; daughter and pt agreeable. FOC offered and pt would like [facility] [phone] for follow up; referral placed. Pt aware HH will see her over weekend.

## 2017-02-09 NOTE — DISCHARGE INSTRUCTIONS
Arthritis: Care Instructions  Your Care Instructions  Arthritis, also called osteoarthritis, is a breakdown of the cartilage that cushions your joints. When the cartilage wears down, your bones rub against each other. This causes pain and stiffness. Many people have some arthritis as they age. Arthritis most often affects the joints of the spine, hands, hips, knees, or feet. You can take simple measures to protect your joints, ease your pain, and help you stay active. Follow-up care is a key part of your treatment and safety. Be sure to make and go to all appointments, and call your doctor if you are having problems. It's also a good idea to know your test results and keep a list of the medicines you take. How can you care for yourself at home? · Stay at a healthy weight. Being overweight puts extra strain on your joints. · Talk to your doctor or physical therapist about exercises that will help ease joint pain. ¨ Stretch. You may enjoy gentle forms of yoga to help keep your joints and muscles flexible. ¨ Walk instead of jog. Other types of exercise that are less stressful on the joints include riding a bicycle, swimming, or water exercise. ¨ Lift weights. Strong muscles help reduce stress on your joints. Stronger thigh muscles, for example, take some of the stress off of the knees and hips. Learn the right way to lift weights so you do not make joint pain worse. · Take your medicines exactly as prescribed. Call your doctor if you think you are having a problem with your medicine. · Take pain medicines exactly as directed. ¨ If the doctor gave you a prescription medicine for pain, take it as prescribed. ¨ If you are not taking a prescription pain medicine, ask your doctor if you can take an over-the-counter medicine. · Use a cane, crutch, walker, or another device if you need help to get around. These can help rest your joints.  You also can use other things to make life easier, such as a higher toilet seat and padded handles on kitchen utensils. · Do not sit in low chairs, which can make it hard to get up. · Put heat or cold on your sore joints as needed. Use whichever helps you most. You also can take turns with hot and cold packs. ¨ Apply heat 2 or 3 times a day for 20 to 30 minutes--using a heating pad, hot shower, or hot pack--to relieve pain and stiffness. ¨ Put ice or a cold pack on your sore joint for 10 to 20 minutes at a time. Put a thin cloth between the ice and your skin. When should you call for help? Call your doctor now or seek immediate medical care if:  · You have sudden swelling, warmth, or pain in any joint. · You have joint pain and a fever or rash. · You have such bad pain that you cannot use a joint. Watch closely for changes in your health, and be sure to contact your doctor if:  · You have mild joint symptoms that continue even with more than 6 weeks of care at home. · You have stomach pain or other problems with your medicine. Where can you learn more? Go to http://gissellSmashrunmony.info/. Enter A790 in the search box to learn more about \"Arthritis: Care Instructions. \"  Current as of: February 24, 2016  Content Version: 11.1  © 3648-4733 [employer]. Care instructions adapted under license by [employer] (which disclaims liability or warranty for this information). If you have questions about a medical condition or this instruction, always ask your healthcare professional. [place] [address] any warranty or liability for your use of this information. Preventing Falls: Care Instructions  Your Care Instructions  Getting around your home safely can be a challenge if you have injuries or health problems that make it easy for you to fall. Loose rugs and furniture in walkways are among the dangers for many older people who have problems walking or who have poor eyesight.  People who have conditions such as arthritis, osteoporosis, or dementia also have to be careful not to fall. You can make your home safer with a few simple measures. Follow-up care is a key part of your treatment and safety. Be sure to make and go to all appointments, and call your doctor if you are having problems. It's also a good idea to know your test results and keep a list of the medicines you take. How can you care for yourself at home? Taking care of yourself  · You may get dizzy if you do not drink enough water. To prevent dehydration, drink plenty of fluids, enough so that your urine is light yellow or clear like water. Choose water and other caffeine-free clear liquids. If you have kidney, heart, or liver disease and have to limit fluids, talk with your doctor before you increase the amount of fluids you drink. · Exercise regularly to improve your strength, muscle tone, and balance. Walk if you can. Swimming may be a good choice if you cannot walk easily. · Have your vision and hearing checked each year or any time you notice a change. If you have trouble seeing and hearing, you might not be able to avoid objects and could lose your balance. · Know the side effects of the medicines you take. Ask your doctor or pharmacist whether the medicines you take can affect your balance. Sleeping pills or sedatives can affect your balance. · Limit the amount of alcohol you drink. Alcohol can impair your balance and other senses. · Ask your doctor whether calluses or corns on your feet need to be removed. If you wear loose-fitting shoes because of calluses or corns, you can lose your balance and fall. · Talk to your doctor if you have numbness in your feet. Preventing falls at home  · Remove raised doorway thresholds, throw rugs, and clutter. Repair loose carpet or raised areas in the floor. · Move furniture and electrical cords to keep them out of walking paths.   · Use nonskid floor wax, and wipe up spills right away, especially on ceramic tile floors. · If you use a walker or cane, put rubber tips on it. If you use crutches, clean the bottoms of them regularly with an abrasive pad, such as steel wool. · Keep your house well lit, especially [employer], and outside walkways. Use night-lights in areas such as hallways and bathrooms. Add extra light switches or use remote switches (such as switches that go on or off when you clap your hands) to make it easier to turn lights on if you have to get up during the night. · Install sturdy handrails on stairways. · Move items in your cabinets so that the things you use a lot are on the lower shelves (about waist level). · Keep a cordless phone and a flashlight with new batteries by your bed. If possible, put a phone in each of the main rooms of your house, or carry a cell phone in case you fall and cannot reach a phone. Or, you can wear a device around your neck or wrist. You push a button that sends a signal for help. · Wear low-heeled shoes that fit well and give your feet good support. Use footwear with nonskid soles. Check the heels and soles of your shoes for wear. Repair or replace worn heels or soles. · Do not wear socks without shoes on wood floors. · Walk on the grass when the sidewalks are slippery. If you live in an area that gets snow and ice in the winter, sprinkle salt on slippery steps and sidewalks. Preventing falls in the bath  · Install grab bars and nonskid mats inside and outside your shower or tub and near the toilet and sinks. · Use shower chairs and bath benches. · Use a hand-held shower head that will allow you to sit while showering. · Get into a tub or shower by putting the weaker leg in first. Get out of a tub or shower with your strong side first.  · Repair loose toilet seats and consider installing a raised toilet seat to make getting on and off the toilet easier. · Keep your bathroom door unlocked while you are in the shower. Where can you learn more?   Go to http://gissell-mony.info/. Enter 0476 [id number] 69 [id number] in the search box to learn more about \"Preventing Falls: Care Instructions. \"  Current as of: August 4, 2016  Content Version: 11.1  © 5115-5171 [employer]. Care instructions adapted under license by [employer] (which disclaims liability or warranty for this information). If you have questions about a medical condition or this instruction, always ask your healthcare professional. [address] any warranty or liability for your use of this information.

## 2017-02-09 NOTE — ED TRIAGE NOTES
Patient fell this morning and has right hip pain. Per ems. Patient states she blacked out and fell out of the bed and has pain to right hip and pelvis pain. Patient denies hitting head. Sepsis Screening completed    (  )Patient meets SIRS criteria. (x )Patient does not meet SIRS criteria.       SIRS Criteria is achieved when two or more of the following are present   Temperature < 96.8°F (36°C) or > 100.9°F (38.3°C)   Heart Rate > 90 beats per minute   Respiratory Rate > 20 beats per minute   WBC count > 12,000 or <4,000 or > 10% bands

## 2017-03-04 PROBLEM — R33.9 URINARY RETENTION: Status: ACTIVE | Noted: 2017-01-01

## 2017-03-04 PROBLEM — R41.82 ALTERED MENTAL STATUS: Status: ACTIVE | Noted: 2017-01-01

## 2017-03-04 PROBLEM — J69.0 ASPIRATION PNEUMONIA (HCC): Status: ACTIVE | Noted: 2017-01-01

## 2017-03-04 PROBLEM — R65.10 SIRS (SYSTEMIC INFLAMMATORY RESPONSE SYNDROME) (HCC): Status: ACTIVE | Noted: 2017-01-01

## 2017-03-04 NOTE — PROGRESS NOTES
1315 Assumed care of pt from LULU Navarrete RN. Pt resting quietly in the stretcher , no signs of distress, call bell within reach. TRANSFER - IN REPORT:    Verbal report received from ELENO Chavez RN(name) on [address]  being received from ED(unit) for routine progression of care      Report consisted of patients Situation, Background, Assessment and   Recommendations(SBAR). Information from the following report(s) SBAR, Kardex, ED Summary, Procedure Summary, Intake/Output, MAR, Accordion, Recent Results and Med Rec Status was reviewed with the receiving nurse. Opportunity for questions and clarification was provided. Assessment completed upon patients arrival to unit and care assumed. 26 Weaned from 4L NC to 2L, pt O2 sats 95%, pt does not normally wear 02 at home.  at bedside    1640 Updated  on pt Troponin, pt denies any pain, shortness of breath or discomfort. Per Tez Lawson give one time dose of Magnesium 2g IV and continue monitoring troponin level. Shift Summary: Pt extremely hard of hearing, pt daughter sent home to get hearing aids. Really hard to communicate with pt d/t that. Rested comfortably in bed throughout shift.

## 2017-03-04 NOTE — ED PROVIDER NOTES
HPI Comments: 7:20 AM   Pennie Bowie is a 67 y.o. Female with a hx of PNA, HTN, DM, COPD, and liver disease presenting via EMS from home on backboard to the ED for AMS. EMS reports family members found pt laying on her bedroom floor, with back against her bed, earlier this morning after supposedly sliding/falling out of bed. Family members were able to get pt up into a chair and immediately after called 911. Upon EMS arrival, pt was responsive only to pain, which is not her baseline, as, per family members, she is normally A&O x 4. EMS states pt was febrile on their arrival with a temperature of 102.8F and her pulse ox was 96% on 4L NC. FSBS was 240. They state they were unable to establish an IV or put on a C collar. Pt recently treated for UTI. Pt also apparently vomited at some point this morning. Hx limited by pt's mental status. The history is provided by the EMS personnel and a relative. No  was used. Past Medical History:   Diagnosis Date    Arrhythmia     pt reports a heart murmur    Chronic obstructive pulmonary disease ([address]     hx of pneumonia    Diabetes ([facility] [address] 2003    Diarrhea     Hypertension     takes B/P meds to protect kidneys    Liver disease     testing for cirrhosis    Osteoarthritis     Pneumonia 2010       Past Surgical History:   Procedure Laterality Date    BREAST SURGERY PROCEDURE UNLISTED Bilateral     fibroid cysts    HX CHOLECYSTECTOMY      HX HYSTERECTOMY      HX MOHS PROCEDURES      HX ORTHOPAEDIC           History reviewed. No pertinent family history. Social History     Social History    Marital status:      Spouse name: N/A    Number of children: N/A    Years of education: N/A     Occupational History    Not on file.      Social History Main Topics    Smoking status: Never Smoker    Smokeless tobacco: Never Used    Alcohol use No    Drug use: No    Sexual activity: No     Other Topics Concern    Not on file Social History Narrative         ALLERGIES: Darvon [propoxyphene]; Pcn [penicillins]; and Tylenol [acetaminophen]    Review of Systems   Unable to perform ROS: Mental status change       Vitals:    03/04/17 1130 03/04/17 1145 03/04/17 1200 03/04/17 1203   BP:  128/72 138/61 138/61   Pulse: 83 79 79 79   Resp: 19 19 19    Temp:       SpO2: 97%  97%    Weight:       Height:                Physical Exam   Constitutional: She appears lethargic. Moaning. She does open her eyes somewhat to verbal stimuli but does not follow commands. HENT:   Mouth/Throat: Mucous membranes are dry. Partially edentulous but not opening mouth completely. Eyes: Pupils are equal, round, and reactive to light. Left eye has cataract. Pupils are sluggish but equal at 3-5 mm. Cardiovascular: Regular rhythm. Occasional extrasystoles are present. Tachycardia present. Exam reveals no gallop and no friction rub. No murmur heard. Pulmonary/Chest: No respiratory distress. She has decreased breath sounds. She has no wheezes. She has no rales. Slightly decreased breath sounds both bases. Abdominal: She exhibits distension. She exhibits no mass. Bowel sounds are decreased. There is no hepatosplenomegaly. There is no tenderness. Obese. Mildly distended. Multiple healed scars. Abdomen did not appear tender, but this is limited given pt's mental status. Musculoskeletal: She exhibits edema. Trace edema BLE. Good pedal pulses. Good cap refill. Neurological: She appears lethargic. Does not follow commands. Able to squeeze hands bilaterally. Moving all 4 extremities spontanesouly to light touch. Nursing note and vitals reviewed. RESULTS:    EKG interpretation: (Preliminary)  8:28 AM   Wide QRS rhythm with fusion complexes, rate 114 bpm. LBBB. Abnormal EKG. No STEMI. EKG read by Rakesh Fried MD    RADIOLOGY FINDINGS  Chest X-ray shows bilateral lower lobe infiltrates with diffuse prominent interstitial markings. Pending review by Radiologist  Recorded by AISHA Naranjo, as dictated by Kelvin Marks MD    CT HEAD WO CONT   Final Result:  1. No evidence of acute infarct, hemorrhage or mass. 2. Stable cerebral atrophy with microvascular ischemic changes. 3. Stable chronic superior right basal ganglia lacunar infarct. As read by the radiologist.       XR CHEST PORT   Final Result:  1. Stable chest with chronic interstitial lung disease. Cannot completely  exclude superimposed mild interstitial pulmonary edema. 2. Stable cardiomegaly. As read by the radiologist.       CT ABD PELV W CONT  Final Result:  1. No evidence of bowel obstruction or pneumoperitoneum. There are several  loops of small bowel in the left midabdomen with mural thickening. Internal  hernia could conceivably cause this appearance however there is no surrounding  induration or pneumatosis. 2. There is subtle induration of the fat adjacent to the duodenum with some  mural thickening which could be related to peptic ulcer disease. Again no  evidence of perforation. 3. Small cystic mass adjacent to the body of the pancreas may represent  pseudocyst. This is a new finding compared to prior exam.  4. Cirrhotic appearance of the liver with splenomegaly suggesting portal  hypertension. 5. Bilateral large nonobstructing renal calculi. 6. Stable bilateral cystic adnexal masses which are probably ovarian. These were  present in 2014.  7. Minimal air in the nondependent position of the bladder. Recommend  correlation as to whether there has been recent catheterization. 8. Uncomplicated colonic diverticulosis. As read by the radiologist.         Labs Reviewed   CBC WITH AUTOMATED DIFF - Abnormal; Notable for the following:        Result Value    PLATELET 82 (*)     NEUTROPHILS 82 (*)     LYMPHOCYTES 7 (*)     ABS.  LYMPHOCYTES 0.4 (*)     All other components within normal limits   METABOLIC PANEL, COMPREHENSIVE - Abnormal; Notable for the following: Glucose 287 (*)     BUN 21 (*)     Creatinine 1.33 (*)     GFR est AA 47 (*)     GFR est non-AA 39 (*)     Bilirubin, total 1.1 (*)     Globulin 4.1 (*)     All other components within normal limits   MAGNESIUM - Abnormal; Notable for the following:     Magnesium 1.1 (*)     All other components within normal limits   CARDIAC PANEL,(CK, CKMB & TROPONIN) - Abnormal; Notable for the following:     Troponin-I, Qt. 0.13 (*)     All other components within normal limits   URINALYSIS W/ RFLX MICROSCOPIC - Abnormal; Notable for the following:     Protein 30 (*)     Glucose 250 (*)     Leukocyte Esterase TRACE (*)     All other components within normal limits   LACTIC ACID, PLASMA - Abnormal; Notable for the following:     Lactic acid 3.2 (*)     All other components within normal limits   URINE MICROSCOPIC ONLY - Abnormal; Notable for the following:     Bacteria 1+ (*)     Yeast 2+ (*)     All other components within normal limits   CULTURE, BLOOD   CULTURE, URINE   LACTIC ACID, PLASMA       Recent Results (from the past 12 hour(s))   CBC WITH AUTOMATED DIFF    Collection Time: 03/04/17  7:34 AM   Result Value Ref Range    WBC 5.6 4.6 - 13.2 K/uL    RBC 4.50 4. 20 - 5.30 M/uL    HGB 13.3 12.0 - 16.0 g/dL    HCT 41.7 35.0 - 45.0 %    MCV 92.7 74.0 - 97.0 FL    MCH 29.6 24.0 - 34.0 PG    MCHC 31.9 31.0 - 37.0 g/dL    RDW 14.1 11.6 - 14.5 %    PLATELET 82 (L) 346 - 420 K/uL    MPV 11.3 9.2 - 11.8 FL    NEUTROPHILS 82 (H) 40 - 73 %    LYMPHOCYTES 7 (L) 21 - 52 %    MONOCYTES 9 3 - 10 %    EOSINOPHILS 2 0 - 5 %    BASOPHILS 0 0 - 2 %    ABS. NEUTROPHILS 4.5 1.8 - 8.0 K/UL    ABS. LYMPHOCYTES 0.4 (L) 0.9 - 3.6 K/UL    ABS. MONOCYTES 0.5 0.05 - 1.2 K/UL    ABS. EOSINOPHILS 0.1 0.0 - 0.4 K/UL    ABS.  BASOPHILS 0.0 0.0 - 0.06 K/UL    DF AUTOMATED     METABOLIC PANEL, COMPREHENSIVE    Collection Time: 03/04/17  7:34 AM   Result Value Ref Range    Sodium 142 136 - 145 mmol/L    Potassium 3.7 3.5 - 5.5 mmol/L    Chloride 100 100 - 108 mmol/L    CO2 30 21 - 32 mmol/L    Anion gap 12 3.0 - 18 mmol/L    Glucose 287 (H) 74 - 99 mg/dL    BUN 21 (H) 7.0 - 18 MG/DL    Creatinine 1.33 (H) 0.6 - 1.3 MG/DL    BUN/Creatinine ratio 16 12 - 20      GFR est AA 47 (L) >60 ml/min/1.73m2    GFR est non-AA 39 (L) >60 ml/min/1.73m2    Calcium 9.6 8.5 - 10.1 MG/DL    Bilirubin, total 1.1 (H) 0.2 - 1.0 MG/DL    ALT (SGPT) 17 13 - 56 U/L    AST (SGOT) 28 15 - 37 U/L    Alk.  phosphatase 78 45 - 117 U/L    Protein, total 8.1 6.4 - 8.2 g/dL    Albumin 4.0 3.4 - 5.0 g/dL    Globulin 4.1 (H) 2.0 - 4.0 g/dL    A-G Ratio 1.0 0.8 - 1.7     MAGNESIUM    Collection Time: 03/04/17  7:34 AM   Result Value Ref Range    Magnesium 1.1 (L) 1.8 - 2.4 mg/dL   CARDIAC PANEL,(CK, CKMB & TROPONIN)    Collection Time: 03/04/17  7:34 AM   Result Value Ref Range     26 - 192 U/L    CK - MB <1.0 <3.6 ng/ml    CK-MB Index Cannot be calulated 0.0 - 4.0 %    Troponin-I, Qt. 0.13 (H) 0.00 - 0.06 NG/ML   LACTIC ACID, PLASMA    Collection Time: 03/04/17  7:34 AM   Result Value Ref Range    Lactic acid 3.2 (HH) 0.4 - 2.0 MMOL/L   URINALYSIS W/ RFLX MICROSCOPIC    Collection Time: 03/04/17  7:35 AM   Result Value Ref Range    Color YELLOW      Appearance CLEAR      Specific gravity 1.014 1.005 - 1.030      pH (UA) 6.5 5.0 - 8.0      Protein 30 (A) NEG mg/dL    Glucose 250 (A) NEG mg/dL    Ketone NEGATIVE  NEG mg/dL    Bilirubin NEGATIVE  NEG      Blood NEGATIVE  NEG      Urobilinogen 0.2 0.2 - 1.0 EU/dL    Nitrites NEGATIVE  NEG      Leukocyte Esterase TRACE (A) NEG     URINE MICROSCOPIC ONLY    Collection Time: 03/04/17  7:35 AM   Result Value Ref Range    WBC 2 to 3 0 - 5 /hpf    RBC 1 to 2 0 - 5 /hpf    Epithelial cells 2+ 0 - 5 /lpf    Bacteria 1+ (A) NEG /hpf    Yeast 2+ (A) NEG   EKG, 12 LEAD, INITIAL    Collection Time: 03/04/17  8:28 AM   Result Value Ref Range    Ventricular Rate 114 BPM    Atrial Rate 117 BPM    QRS Duration 136 ms    Q-T Interval 368 ms    QTC Calculation (Bezet) 500 ms    Calculated R Axis -25 degrees    Calculated T Axis 121 degrees    Diagnosis       Wide QRS rhythm with fusion complexes  Left bundle branch block  Abnormal ECG  When compared with ECG of 05-DEC-2016 21:57,  Wide QRS rhythm has replaced Sinus rhythm          MDM  Number of Diagnoses or Management Options  Altered mental status, unspecified altered mental status type:   Dehydration:   Do not resuscitate:   Infiltrate noted on imaging study:   Non-intractable vomiting with nausea, unspecified vomiting type:   SIRS (systemic inflammatory response syndrome) ([facility] [address]):   Urinary retention:      Amount and/or Complexity of Data Reviewed  Clinical lab tests: ordered and reviewed  Tests in the radiology section of CPT®: ordered and reviewed (CXR, CT head, CT abdomen/pelvis)  Tests in the medicine section of CPT®: reviewed and ordered (EKG)  Obtain history from someone other than the patient: yes (EMS personnel, family member)  Discuss the patient with other providers: yes Mindi Curry,  (Internal Medicine))  Independent visualization of images, tracings, or specimens: yes (EKG, CXR)    Critical Care  Total time providing critical care: 30-74 minutes (60)    ED Course     MEDICATIONS GIVEN:  Medications   sodium chloride (NS) flush 5-10 mL (not administered)   levoFLOXacin (LEVAQUIN) 750 mg in D5W IVPB (0 mg IntraVENous IV Completed 3/4/17 1034)   vancomycin (VANCOCIN) 1,750 mg in 0.9% sodium chloride 500 mL IVPB (1,750 mg IntraVENous New Bag 3/4/17 1034)   magnesium sulfate 2 g/50 ml IVPB (premix or compounded) (2 g IntraVENous New Bag 3/4/17 1203)   fluconazole (DIFLUCAN) 100 mg/50 ml IVPB (premix) (not administered)   sodium chloride 0.9 % bolus infusion 2,886 mL (0 mL/kg × 96.2 kg IntraVENous IV Completed 3/4/17 1143)   ondansetron (ZOFRAN) injection 4 mg (4 mg IntraVENous Given 3/4/17 0049)   iopamidol (ISOVUE 300) 61 % contrast injection  mL (60 mL IntraVENous Given 3/4/17 1054)   ketorolac (TORADOL) injection 15 mg (15 mg IntraVENous Given 3/4/17 1138)        Procedures      PROGRESS NOTE:   7:20 AM  Initial assessment performed. Written by Tor Menezes ED Scribe, as dictated by Abhinav Mabry MD     PROGRESS NOTE:   9:02 AM  Family member now at bedside, who states pt is a DNR and confirms the information that EMS told us. Pt  was on Levaquin last week and on Macrobid this week for UTI. PROGRESS NOTE:   11:11 AM  Pt had urinary retention. After jacobsen catheter was placed, able to obtain 800 cc urine. CONSULT NOTE:   11:22 AM  Abhinav Mabry MD spoke with Deena Shepherd DO   Specialty: Internal Medicine  Discussed pt's hx, disposition, and available diagnostic and imaging results over the telephone. Reviewed care plans. Consulting physician agrees with plans as outlined. He agrees to admit the pt to telemetry. Written by Galo Fontenot ED Scribe, as dictated by Abhinav Mabry MD.     ADMISSION NOTE:  11:22 AM  Patient is being admitted to the hospital by Deena Shepherd DO. The results of their tests and reasons for their admission have been discussed with them and/or available family. They convey agreement and understanding for the need to be admitted and for their admission diagnosis. Written by Galo Fontenot ED Scribe, as dictated by Abhinav Mabry MD.    CONDITIONS ON ADMISSION:  Urinary Tract Infection is present at the time of admission. Pneumonia is present at the time of admission. MRSA is not present at the time of admission. Wound infection is not present at the time of admission. Pressure Ulcer is not present at the time of admission. PROGRESS NOTE:   11:45 AM  Pt has been re-examined by Abhinav Mabry MD. Pt is feeling better, waking up. Oriented x 2.5, which is close to her baseline. She has only complaints of bilateral leg pain, which is chronic. No neck pain/stiffness. She has no meningeal signs. Lungs with crackles at the right base. No respiratory distress.  Heart is RRR with no MRG.     PROGRESS NOTE:   11:51 AM  Pt has been re-examined by Robert Zuluaga MD. I offered LP, but Whit Fletcher, pts POA (and daughter), declines the offer, given that pt is improving and doesnt think this is the main problem. She understands the risks of missing meningitis or other cns infection from not doing an LP. CLINICAL IMPRESSION    1. SIRS (systemic inflammatory response syndrome) (HCC)    2. Infiltrate noted on imaging study    3. Non-intractable vomiting with nausea, unspecified vomiting type    4. Altered mental status, unspecified altered mental status type    5. Dehydration    6. Do not resuscitate    7. Urinary retention           Attestations: This note is prepared by Milo Mustafa, acting as Scribe for MD Robert Dhillon MD: The scribe's documentation has been prepared under my direction and personally reviewed by me in its entirety. I confirm that the note above accurately reflects all work, treatment, procedures, and medical decision making performed by me. 11:42 AM  I have spent 60 minutes of critical care time involved in lab review, consultations with specialist, family decision-making, and documentation. During this entire length of time I was immediately available to the patient. Critical Care: The reason for providing this level of medical care for this critically ill patient was due a critical illness that impaired one or more vital organ systems such that there was a high probability of imminent or life threatening deterioration in the patients condition. This care involved high complexity decision making to assess, manipulate, and support vital system functions, to treat this degreee vital organ system failure and to prevent further life threatening deterioration of the patients condition.

## 2017-03-04 NOTE — H&P
History & Physical    Patient: Pennie Wiley MRN: [id number]  CSN: [id number]    YOB: 1945  Age: 67 y.o. Sex: female      DOA: 3/4/2017  Primary Care Provider:  Estuardo Ruvalcaba MD      Assessment/Plan     1. Sepsis due to UTi v possible aspiration pneumonia  2. Cirrhosis  3. DM2  4. HTN  5. Minimal troponin elevation with normal index  6. New left bundle branch block, doubt MI currently  7. CKD 3  8. Portal hypertension  9/ thrombocytopenia due to #2  10. PCN allergy  11. 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 Vision impairment    PLAN:  - Admit to medical service with telemetry monitoring  - culture blood, urine  - start levaquin & diflucan for + yeast on UA  - trend cardiac enzymes  - IV fluids  - 2d echo, repeat EKG tomorrow AM; should cardiac enzymes rise dramatically or there is significant finding on echo will consider cardiology consultation  - check ammonia  - continue basal/bolus insulin  - DNR/DNi, dvt ppx SCDs    Patient Active Problem List   Diagnosis Code    Chronic diarrhea K52.9    Cirrhosis of liver ([facility] [address] K74.60    Type II diabetes mellitus (HCC) E11.9    Pancreatic mass K86.9    HTN (hypertension) I10    Pancreatic insufficiency K86.89    UTI (urinary tract infection) N39.0    Sepsis (HCC) A41.9    Acute metabolic encephalopathy C54.70    CKD (chronic kidney disease) stage 3, GFR 30-59 ml/min N18.3    Thrombocytopenia (HCC) D69.6    History of stroke Z86.73    Dehydration E86.0    Urinary retention R33.9    Aspiration pneumonia (HCC) J69.0    Altered mental status R41.82    SIRS (systemic inflammatory response syndrome) (HCC) R65.10     HPI:   CC: confusion  Pennie Wiley is a 67 y.o. female with past medical history significant for DM2, cirrhosis, OA, chronic diarrhea, COPD presents to the ER for worsening confusion. History is obtianed from daughter and chart review.  She has had variable confusion for the past few months since her hospitalization in December but had acutely worsened over the past week. She saw PCP and was started on macrobid for UTI. This  Morning she was noted to have appeared to fall out of her bed with significant amount of vomitus in room. She was febrile to 102.8, tachycadric, hypertensive w abdominal distention and trace LE edema. Labs with left shift, thrombocytopenia, renal insufficiency, lactic acid 3.2, troponin elevation to 0.13. EKG w new left bundle branch, CXR clear, CT abdomen pelvis w no acute finding. UA w + leukocyte esterase, + WBC, + bacteria, + yeast. Medicine is asked to admit for further management. Past Medical History:   Diagnosis Date    Arrhythmia     pt reports a heart murmur    Chronic obstructive pulmonary disease ([facility] [address]     hx of pneumonia    Diabetes ([facility] [address] 2003    Diarrhea     Hypertension     takes B/P meds to protect kidneys    Liver disease     testing for cirrhosis    Osteoarthritis     Pneumonia 2010     Past Surgical History:   Procedure Laterality Date    BREAST SURGERY PROCEDURE UNLISTED Bilateral     fibroid cysts    HX CHOLECYSTECTOMY      HX HYSTERECTOMY      HX MOHS PROCEDURES      HX ORTHOPAEDIC        Social History   Substance Use Topics    Smoking status: Never Smoker    Smokeless tobacco: Never Used    Alcohol use No     History reviewed. No pertinent family history. No current facility-administered medications on file prior to encounter. Current Outpatient Prescriptions on File Prior to Encounter   Medication Sig Dispense Refill    carvedilol (COREG) 12.5 mg tablet Take 12.5 mg by mouth two (2) times a day. Indications: hypertension      glucosamine-chondroitin (OSTEO BI-FLEX) 250-200 mg tab Take 250 mg by mouth daily. Indications: joint care      calcitRIOL (ROCALTROL) 0.25 mcg capsule Take 0.25 mcg by mouth daily. Indications: hypocalcemia      CALCIUM CARBONATE (CALCIUM 600 PO) Take 600 mg by mouth daily.  Indications: low calcium      b complex vitamins tablet Take 1 Tab by mouth daily.  insulin aspart (NOVOLOG FLEXPEN) 100 unit/mL inpn 15 Units by SubCUTAneous route Before breakfast, lunch, and dinner.  insulin glargine (LANTUS SOLOSTAR) 100 unit/mL (3 mL) pen 40 Units by SubCUTAneous route every morning.  insulin glargine (LANTUS SOLOSTAR) 100 unit/mL (3 mL) pen 15 Units by SubCUTAneous route Daily (before dinner).  lactulose 10 gram/15 mL (15 mL) soln Take 15 mL by mouth daily. 500 mL 3    celecoxib (CELEBREX) 100 mg capsule Take 100 mg by mouth two (2) times a day.  escitalopram oxalate (LEXAPRO) 10 mg tablet Take 10 mg by mouth daily.  lipase-protease-amylase (ZENPEP) capsule Take 2 Caps by mouth three (3) times daily (with meals). Strength 10,000 units capsule  Indications: bowel leakage      losartan (COZAAR) 50 mg tablet Take 50 mg by mouth daily. Indications: hypertension      folic acid 122 mcg tablet Take 400 mcg by mouth daily. Allergies   Allergen Reactions    Darvon [Propoxyphene] Shortness of Breath    Pcn [Penicillins] Swelling    Tylenol [Acetaminophen] Other (comments)     Confusion       Review of Systems  Unable to obtain 2/2 mental state    Physical Exam:        Visit Vitals    /71 (BP 1 Location: Right arm, BP Patient Position: At rest)    Pulse 69    Temp 98.3 °F (36.8 °C)    Resp 16    Ht 5' 5\" (1.651 m)    Wt 96.2 kg (212 lb)    SpO2 95%    Breastfeeding No    BMI 35.28 kg/m2    O2 Flow Rate (L/min): 4 l/min O2 Device: Nasal cannula    Temp (24hrs), Av.6 °F (38.1 °C), Min:98.3 °F (36.8 °C), Max:103.3 °F (39.6 °C)           Body mass index is 35.28 kg/(m^2). General:   lethargic, arousable, NAD   Head:  Normocephalic, without obvious abnormality, atraumatic. Eyes:  Conjunctivae/corneas clear, sclera anicteric, PERRL + cataract   Nose: Nares normal. No drainage or sinus tenderness. Throat: Lips, mucosa, and tongue normal, MM dry.    Neck: Supple, symmetrical, trachea midline, no adenopathy. Lungs:   Clear to auscultation bilaterally, equal expansion       Heart:  Regular rate and rhythm, S1, S2 normal, no murmur, click, rub or gallop, cap refill normal      Abdomen: Obese, distended,Soft, non-tender. Bowel sounds normal. No masses,  No organomegaly. Extremities: Extremities normal, atraumatic, no cyanosis or edema. Pulses: 2+ and symmetric all extremities. Skin: Skin color pale, texture, turgor normal. No rashes or lesions   Neurologic: CNII-XII intact. No focal motor or sensory deficit. Laboratory Studies:    CMP:   Lab Results   Component Value Date/Time     03/04/2017 07:34 AM    K 3.7 03/04/2017 07:34 AM     03/04/2017 07:34 AM    CO2 30 03/04/2017 07:34 AM    AGAP 12 03/04/2017 07:34 AM     (H) 03/04/2017 07:34 AM    BUN 21 (H) 03/04/2017 07:34 AM    CREA 1.33 (H) 03/04/2017 07:34 AM    GFRAA 47 (L) 03/04/2017 07:34 AM    GFRNA 39 (L) 03/04/2017 07:34 AM    CA 9.6 03/04/2017 07:34 AM    MG 1.1 (L) 03/04/2017 07:34 AM    ALB 4.0 03/04/2017 07:34 AM    TP 8.1 03/04/2017 07:34 AM    GLOB 4.1 (H) 03/04/2017 07:34 AM    AGRAT 1.0 03/04/2017 07:34 AM    SGOT 28 03/04/2017 07:34 AM    ALT 17 03/04/2017 07:34 AM     CBC:   Lab Results   Component Value Date/Time    WBC 5.6 03/04/2017 07:34 AM    HGB 13.3 03/04/2017 07:34 AM    HCT 41.7 03/04/2017 07:34 AM    PLT 82 (L) 03/04/2017 07:34 AM     All Cardiac Markers in the last 24 hours:   Lab Results   Component Value Date/Time     03/04/2017 07:34 AM    CKMB <1.0 03/04/2017 07:34 AM    CKND1 Cannot be calulated 03/04/2017 07:34 AM    TROIQ 0.13 (H) 03/04/2017 07:34 AM       Imaging studies personally reviewed:  CXR: no infiltrate  EKG: new left bundle  CT abdomen/pelvis:\" 1. No evidence of bowel obstruction or pneumoperitoneum. There are several  loops of small bowel in the left midabdomen with mural thickening.  Internal  hernia could conceivably cause this appearance however there is no surrounding  induration or pneumatosis. 2. There is subtle induration of the fat adjacent to the duodenum with some  mural thickening which could be related to peptic ulcer disease. Again no  evidence of perforation. 3. Small cystic mass adjacent to the body of the pancreas may represent  pseudocyst. This is a new finding compared to prior exam.  4. Cirrhotic appearance of the liver with splenomegaly suggesting portal  hypertension. 5. Bilateral large nonobstructing renal calculi. 6. Stable bilateral cystic adnexal masses which are probably ovarian. These were  present in 2014.  7. Minimal air in the nondependent position of the bladder. Recommend  correlation as to whether there has been recent catheterization. 8. Uncomplicated colonic diverticulosis. \"    Reviewed old records including old H&P, consultation notes and DC summaries        Lennie Longoria DO  Internal Medicine/Geriatrics

## 2017-03-04 NOTE — ED NOTES
Patients daughter at bedside and tearful. Provided with reassurance. States she has been on antibiotics for one month and nothing seem to be helping. Included in plan of care.

## 2017-03-04 NOTE — PROGRESS NOTES
When  attempted to visit patient it was not a good time to visit. Patient is hard of hearing and could not understand  even when spoke loudly to. Patient said her daughter is supposed to be bringing her hearing aid soon.  provided the \"Our Prayer for You\" greeting card. She expressed appreciation but out conversation was limited due to her hearing disability. Chaplains will provide spiritual care as needed, as requested. Cuate Jaime MDiv.   Board Certified [employer] [phone]

## 2017-03-04 NOTE — ED TRIAGE NOTES
Patient arrived via ems. Patient was on the floor with her back against the bed. Patient is altered per ems and she vomited. Sepsis Screening completed    (  )Patient meets SIRS criteria. (x  )Patient does not meet SIRS criteria.       SIRS Criteria is achieved when two or more of the following are present   Temperature < 96.8°F (36°C) or > 100.9°F (38.3°C)   Heart Rate > 90 beats per minute   Respiratory Rate > 20 breaths per minute   WBC count > 12,000 or <4,000 or > 10% bands

## 2017-03-04 NOTE — ROUTINE PROCESS
TRANSFER - OUT REPORT:    Verbal report given to Aleida Benson RN(name) on Effie Dangelo  being transferred to telemetry(unit) for routine progression of care       Report consisted of patients Situation, Background, Assessment and   Recommendations(SBAR). MEWs score 1 and patient will need diflucan IV when antibiotics and mag are complteed. Patient has had no further vomiting since givn zofran and temperature continues to decline    Information from the following report(s) SBAR, Kardex, ED Summary and MAR was reviewed with the receiving nurse. Lines:   Peripheral IV 03/04/17 Right Antecubital (Active)   Site Assessment Clean, dry, & intact 3/4/2017  7:39 AM   Dressing Status Clean, dry, & intact 3/4/2017  7:39 AM   Dressing Type Transparent 3/4/2017  7:39 AM   Hub Color/Line Status Pink 3/4/2017  7:39 AM       Peripheral IV 03/04/17 Left;Mid Forearm (Active)   Site Assessment Clean, dry, & intact 3/4/2017 11:01 AM   Phlebitis Assessment 0 3/4/2017 11:01 AM   Infiltration Assessment 0 3/4/2017 11:01 AM   Dressing Status Clean, dry, & intact; Occlusive 3/4/2017 11:01 AM   Dressing Type Transparent 3/4/2017 11:01 AM   Hub Color/Line Status Green;Flushed;Patent 3/4/2017 11:01 AM   Action Taken Blood drawn 3/4/2017 11:01 AM        Opportunity for questions and clarification was provided.       Patient transported with:   Monitor  O2 @ 4 liters  Registered Nurse  [facility]

## 2017-03-04 NOTE — ED NOTES
Consulted with Dr. Viri Martinez about patient has completed her 30ml/kg of ivf as ordered and will need to reasess tissue perfusion reassessment. He is at bedside at this time. Patient is much more alert at this time but due to hard of hearing communication is difficult. Daughter reports even with her she has a hard time. Patient has had no further vomiting.  Will continue to monitor

## 2017-03-04 NOTE — ED NOTES
Patient incont of urine leaking off the side of bed. Bladder scanner showed> 500ml. Consulted with Dr. Wilbert Jackson who will order jacobsen for urinary retention and toradol IV for fever. Daughter at bedside.  Bed linen changed and patient repositioned for comfort

## 2017-03-04 NOTE — PROGRESS NOTES
Pharmacy Dosing Services      The following medication: Levaquin, was automatically dose-adjusted per [facility] P&T Committee Protocol, with respect to renal function. Consult provided for this   67 y.o. , female , for the indication of UTI    Pt Weight:   Wt Readings from Last 1 Encounters:   03/04/17 96.2 kg (212 lb)         Previous Regimen Levofloxacin 750 mg IV q24   Serum Creatinine Lab Results   Component Value Date/Time    Creatinine 1.33 03/04/2017 07:34 AM       Creatinine Clearance Estimated Creatinine Clearance: 43.2 mL/min (based on Cr of 1.33). BUN Lab Results   Component Value Date/Time    BUN 21 03/04/2017 07:34 AM         Current dose of 500 mg IV q24 is appropriate for current state of RF.  500 mg dose is set to begin 05Mar, as 750 mg IV was given in ED earlier today. Pharmacy to continue to monitor patient daily. Will make dosage adjustments based upon changing renal function. Светлана Encarnacion, Pharm. D.   Clinical Pharmacist  [phone]

## 2017-03-05 NOTE — PROGRESS NOTES
Bedside and Verbal shift change report given to Robert Benson (oncoming nurse) by Fariba Baez RN (offgoing nurse).  Report included the following information SBAR, Kardex, Intake/Output, MAR, Recent Results and Cardiac Rhythm SR.

## 2017-03-05 NOTE — CONSULTS
67 WF that is a DNR and has DM, cirrhosis, chronic diarrhea admitted for confusion due to sepsis. Unclear if infection from UTI or asp pneumonia. Patient is a very poor historian and reports some chest pains, but difficult to determine if could be cardiac as some of it is left sided rib pain and back pain. Daughter did say she has c/o some chest discomfort for past couple weeks. She leads a very sedentary lifestyle. LBBB noted on EKG is new since last EKG in December 2016. CPKs are negative, but very mild rise in troponin to 0.43. This rise could be related to her sepsis. She had some CP yesterday, but was sharp. This patient should be treated conservatively. Can give her a trial of long acting nitrates and continue her Coreg (beta blocker) at the increased dose (HR in the 60s now). However, she already has frequent headaches---so unclear if she will be able to tolerate nitrates. Has TTE ordered for tomorrow. Dr. Samira Taveras will follow up tomorrow. The patient has been examined and chart reviewed. Full consult to follow (dictated).

## 2017-03-05 NOTE — ROUTINE PROCESS
Bedside and Verbal shift change report given to David Clifton RN (oncoming nurse) by Natalie Beck RN (offgoing nurse). Report included the following information SBAR, Kardex, ED Summary, Procedure Summary, Intake/Output, MAR, Recent Results and Med Rec Status.

## 2017-03-05 NOTE — PROGRESS NOTES
1942:  Assumed care. Pt awake, alert and oriented. Pt resting in bed with no acute signs of distress. Call bell and telephone within reach. White board updated. [facility] 5264:  Rhythm Change:  EKG ordered. No change in EKG    Shift Summary:  Pt had uneventful shift. Pt in bed resting with no signs of distress or c/o pain.

## 2017-03-05 NOTE — PROGRESS NOTES
Hospitalist Progress Note    Patient: Sue Maldonado MRN: [id number]  CSN: [id number]    YOB: 1945  Age: 67 y.o. Sex: female    DOA: 3/4/2017 LOS:  LOS: 1 day            Assessment/Plan     1. Sepsis due to UTi v possible aspiration pneumonia  2. Cirrhosis  3. DM2  4. HTN  5. Minimal troponin elevation with normal index- denied chest pain yesterday but did state she is having intermittent substernal chest \" pressure\"  6. New left bundle branch block, doubt MI currently  7. CKD 3  8. Portal hypertension  9. thrombocytopenia due to #2  10. PCN allergy  11. 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 Vision impairment     Plan:  - continue antibiotics, monitor culture data  - increase coreg  - consult cardiology  - await 2d echo  - mobilize w PT/OT  - decrease lantus      Patient Active Problem List   Diagnosis Code    Chronic diarrhea K52.9    Cirrhosis of liver ([facility] [address] K74.60    Type II diabetes mellitus ([facility] [address] E11.9    Pancreatic mass K86.9    HTN (hypertension) I10    Pancreatic insufficiency K86.89    UTI (urinary tract infection) N39.0    Sepsis ([facility] [address] A41.9    Acute metabolic encephalopathy I18.51    CKD (chronic kidney disease) stage 3, GFR 30-59 ml/min N18.3    Thrombocytopenia (HCC) D69.6    History of stroke Z86.73    Dehydration E86.0    Urinary retention R33.9    Aspiration pneumonia ([facility]) J69.0    Altered mental status R41.82    SIRS (systemic inflammatory response syndrome) ([facility]) R65.10               Subjective:    cc: AMS  Pt alert this AM, less confused  She states she had left sided chest pressure last night, lasted 10-15 minutes, did not notify RN, no associated dyspnea, diaphoresis      REVIEW OF SYSTEMS:  General: No fevers or chills. Cardiovascular: + chest pain or pressure. No palpitations. Pulmonary: No shortness of breath. Gastrointestinal: No nausea, vomiting.      Objective:        Vital signs/Intake and Output:  Visit Vitals    /49 (BP 1 Location: Right arm, BP Patient Position: At rest)    Pulse 80    Temp 98.9 °F (37.2 °C)    Resp 18    Ht 5' 5\" (1.651 m)    Wt 96.2 kg (212 lb)    SpO2 96%    Breastfeeding No    BMI 35.28 kg/m2     Current Shift:  03/04 1901 - 03/05 0700  In: 643.8 [P.O.:300; I.V.:343.8]  Out: 300 [Urine:300]  Last three shifts:  03/03 0701 - 03/04 1900  In: 460 [P.O.:460]  Out: 550 [Urine:550]    Body mass index is 35.28 kg/(m^2).     Physical Exam:  GEN: Alert and oriented times three NAD  EYES: conjunctiva normal, lids with out lesions  HEENT: MMM, No thyromegaly, no lymphadenopathy  HEART: RRR +S1 +S2, no JVD, pulses 2+ distally  LUNGS: CTA B/L no wheezes, rales or rhonchi  ABDOMEN: + BS, soft NT/ND no organomegaly,  No rebound  EXTREMITIES: No edema cyanosis, cap refill normal   SKIN: no rashes or skin breakdown, no nodules, normal turgor  Current Facility-Administered Medications   Medication Dose Route Frequency    carvedilol (COREG) tablet 25 mg  25 mg Oral BID    insulin glargine (LANTUS) injection 20 Units  20 Units SubCUTAneous DAILY    sodium chloride (NS) flush 5-10 mL  5-10 mL IntraVENous PRN    sodium chloride (NS) flush 5-10 mL  5-10 mL IntraVENous PRN    fluconazole (DIFLUCAN) 100 mg/50 ml IVPB (premix)  100 mg IntraVENous Q24H    ondansetron (ZOFRAN) injection 4 mg  4 mg IntraVENous Q4H PRN    insulin lispro (HUMALOG) injection   SubCUTAneous AC&HS    glucose chewable tablet 16 g  4 Tab Oral PRN    glucagon (GLUCAGEN) injection 1 mg  1 mg IntraMUSCular PRN    dextrose (D50W) injection syrg 12.5-25 g  25-50 mL IntraVENous PRN    vitamin B complex tablet  1 Tab Oral DAILY    folic acid tablet 0.4 mg  400 mcg Oral DAILY    lactulose (CHRONULAC) solution 10 g  15 mL Oral DAILY    amylase-lipase-protease (CREON 76458) capsule 2 Cap  2 Cap Oral TID WITH MEALS    0.9% sodium chloride infusion  75 mL/hr IntraVENous CONTINUOUS    levoFLOXacin (LEVAQUIN) 500 mg in D5W IVPB  500 mg IntraVENous Q24H         All the patient's labs over the past 24 hours were reviewed both during my initial daily workflow process and at the time notated as \"note time\" in [address] [facility]. (It is not time stamped separately in this workflow.)  Select labs are listed below.         Labs: Results:       Chemistry Recent Labs      03/04/17   0734   GLU  287*   NA  142   K  3.7   CL  100   CO2  30   BUN  21*   CREA  1.33*   CA  9.6   AGAP  12   BUCR  16   AP  78   TP  8.1   ALB  4.0   GLOB  4.1*   AGRAT  1.0      CBC w/Diff Recent Labs      03/05/17   0601  03/04/17   0734   WBC  3.1*  5.6   RBC  3.63*  4.50   HGB  10.7*  13.3   HCT  33.9*  41.7   PLT  63*  82*   GRANS  68  82*   LYMPH  17*  7*   EOS  4  2      Cardiac Enzymes Recent Labs      03/04/17   2120  03/04/17   1500   CPK  135  146   CKND1  0.7  0.9                  Liver Enzymes Recent Labs      03/04/17   0734   TP  8.1   ALB  4.0   AP  78   SGOT  28      Thyroid Studies Lab Results   Component Value Date/Time    TSH 1.43 12/06/2016 07:00 AM        Procedures/imaging: see electronic medical records for all procedures/Xrays and details which were not copied into this note but were reviewed prior to creation of [address] [place] [address] Rd, DO  Internal Medicine/Geriatrics

## 2017-03-05 NOTE — PROGRESS NOTES
Problem: Mobility Impaired (Adult and Pediatric)  Goal: *Acute Goals and Plan of Care (Insert Text)  Physical Therapy Goals  Initiated 3/5/2017 and to be accomplished within 5 day(s)  1. Patient will move from supine <> sit with S in prep for out of bed activity and change of position. 2. Patient will perform sit<> stand with S with rolling walker in prep for transfers/ambulation. 3. Patient will transfer from bed <> chair with CGA with rolling walker for time up in chair for completion of ADL activity. 4. Patient will ambulate 100 feet with rolling walker with CGA for increase functional mobility. 5. Patient will ascend/descend 8 steps with handrail(s) with minimal assistance/contact guard assist for home re-entry as needed. Outcome: Progressing Towards Goal  PHYSICAL THERAPY EVALUATION     Patient: Mami Garland (75 y.o. female)  Date: 3/5/2017  Primary Diagnosis: Altered mental status  SIRS (systemic inflammatory response syndrome) (HCC)  Aspiration pneumonia (HCC)  Urinary retention  Precautions:Fall      ASSESSMENT :  Based on the objective data described below, the patient presents with decreased independence in functional mobility with regard to bed mobility, transfers, gait, balance and activity tolerance. Pt found supine in bed; daughter Yunior Mahoney present. Pt O2 sat 89% at rest on RA. Family reports doctor said okay to use O2 if dizzy. Upon transfer to sit EOB mod assist, pt c/o dizziness and O2 sat decreasing. O2 applied at 2Lnc and sat increased to 95% at rest (remained in mid 90's with activity). Pt requires mod assist with bed elevated to transfer to stand. Demonstrates fair balance and ability to gt train 2ft with RW/min/mod A along side of bed with slow,shuffling gt. Patient reports pain 7/10 right knee due to arthritis. Pt left supine in bed with all needs in reach and nurse Altaid 32 notified. Pt left on O2 with sat at 100% and nurse aware.   Recommend SNF for follow up physical therapy upon discharge to reach maximal level of independence/safety with functional mobility. Patient will benefit from skilled intervention to address the above impairments. Patients rehabilitation potential is considered to be Good  Factors which may influence rehabilitation potential include:   [ ]         None noted  [X]         Mental ability/status  [X]         Medical condition  [ ]         Home/family situation and support systems  [ ]         Safety awareness  [ ]         Pain tolerance/management  [ ]         Other:        PLAN :  Recommendations and Planned Interventions:  [X]           Bed Mobility Training             [ ]    Neuromuscular Re-Education  [X]           Transfer Training                   [ ]    Orthotic/Prosthetic Training  [X]           Gait Training                          [ ]    Modalities  [X]           Therapeutic Exercises          [ ]    Edema Management/Control  [X]           Therapeutic Activities            [X]    Patient and Family Training/Education  [ ]           Other (comment):     Frequency/Duration: Patient will be followed by physical therapy daily to address goals. Discharge Recommendations: Home Health  Further Equipment Recommendations for Discharge: N/A       SUBJECTIVE:   Patient stated I want to walk up in my home.       OBJECTIVE DATA SUMMARY:       Past Medical History:   Diagnosis Date    Arrhythmia       pt reports a heart murmur    Chronic obstructive pulmonary disease ([facility] [address]       hx of pneumonia    Diabetes ([facility] [address] 2003    Diarrhea      Hypertension       takes B/P meds to protect kidneys    Liver disease       testing for cirrhosis    Osteoarthritis      Pneumonia 2010     Past Surgical History:   Procedure Laterality Date    BREAST SURGERY PROCEDURE UNLISTED Bilateral       fibroid cysts    HX CHOLECYSTECTOMY        HX HYSTERECTOMY        HX MOHS PROCEDURES        HX ORTHOPAEDIC         Barriers to Learning/Limitations: yes;  altered mental status (i.e.Sedation, Confusion)  Compensate with: Visual Cues and Verbal Cues  Prior Level of Function/Home Situation: ambulating with Rollator PTA, but spent large amount of time in bed since having continuous UTI since December  Home Situation  Home Environment: Private residence  # Steps to Enter: 8  Rails to Enter: Yes  Hand Rails : Bilateral  Wheelchair Ramp: No  One/Two Story Residence: One story  Living Alone: No  Support Systems: Child(marcos)  Patient Expects to be Discharged to[de-identified] Unknown  Current DME Used/Available at Home: Walker, rollator, Shower chair, Grab bars  Tub or Shower Type: Shower  Critical Behavior:  Neurologic State: Alert; Appropriate for age  Orientation Level: Oriented to person;Oriented to situation;Oriented to place  Cognition: Follows commands  Psychosocial  Purposeful Interaction: Yes  Pt Identified Daily Priority: Clinical issues (comment); Communication issues (comment)  Caritas Process: Nurture loving kindness;Establish trust;Attend basic human needs;Create healing environment  Caring Interventions: Reassure; Therapeutic modalities  Reassure: Therapeutic listening; Instilling julio and hope;Quiet presence; Support family;Caring rounds  Therapeutic Modalities: Humor; Intentional therapeutic touch  Skin Condition/Temp: Dry;Warm  Skin Integrity: Abrasion (old abrasions to rt arm )  Skin Integumentary  Skin Color: Pale  Skin Condition/Temp: Dry;Warm  Skin Integrity: Abrasion (old abrasions to rt arm )  Turgor: Non-tenting  Hair Growth: Present  Varicosities: Absent  Strength:    Strength: Generally decreased, functional  Tone & Sensation:   Tone: Normal  Sensation: Intact  Range Of Motion:  AROM: Generally decreased, functional especially right knee (h/o severe OA per daughter)  PROM: Generally decreased, functional  Functional Mobility:  Bed Mobility:  Rolling: Supervision  Supine to Sit: Moderate assistance  Sit to Supine: Minimum assistance  Transfers:  Sit to Stand:  Moderate assistance; Additional time bed elevated  Stand to Sit: Moderate assistance; Additional time  Balance:   Sitting: Intact  Standing: Impaired;Pull to stand; With support  Standing - Static: Fair;Constant support  Standing - Dynamic : Fair  Ambulation/Gait Training:  Distance (ft): 2 Feet (ft)  Assistive Device: Gait belt;Walker, rolling  Ambulation - Level of Assistance: Moderate assistance;Minimal assistance  Gait Description (WDL): Exceptions to WDL  Gait Abnormalities: Decreased step clearance;Shuffling gait; Step to gait; Antalgic (antalgic right knee; OA by daughter report)  Base of Support: Narrowed  Speed/Bethany: Slow;Shuffled  Step Length: Right shortened;Left shortened  Swing Pattern: Right asymmetrical;Left asymmetrical  Interventions: Safety awareness training; Tactile cues; Verbal cues; Visual/Demos  Pain:  Pain Scale 1: Numeric (0 - 10)  Pain Intensity 1: 7  Pain Location 1: Knee  Pain Orientation 1: Right  Pain Description 1: Aching  Pain Intervention(s) 1: Nurse notified  Activity Tolerance:   Fair   Please refer to the flowsheet for vital signs taken during this treatment. After treatment:   [ ]         Patient left in no apparent distress sitting up in chair  [X]         Patient left in no apparent distress in bed  [X]         Call bell left within reach  [X]         Nursing notified  [X]         Caregiver present-daughter Francy Balderas  [ ]         Bed alarm activated      COMMUNICATION/EDUCATION:   [X]         Fall prevention education was provided and the patient/caregiver indicated understanding. [X]         Patient/family have participated as able in goal setting and plan of care. [X]         Patient/family agree to work toward stated goals and plan of care. [ ]         Patient understands intent and goals of therapy, but is neutral about his/her participation. [ ]         Patient is unable to participate in goal setting and plan of care.      G-Codes (GP)  Mobility  [id number] Current  CK= 40-59%  [id number] Goal CI= 1-19%  The severity rating is based on the functional mobility assessment.   Thank you for this referral.  Lyn Merino, PT   Time Calculation: 29 mins

## 2017-03-05 NOTE — PROGRESS NOTES
[facility] care of pt from Elfida Libman, RN. Pt resting quietly in bed, no signs of distress. Pt states that earlier this morning she was having chest pain (PM RN and MD aware), however it has since resolved, pt instructed to use call bell if the pain comes back. 0855 Pt assessed, was sleeping but short of breath, O2 sats 88% on RA, NC 2L given, O2 sats 95%3    1030 Paged Hans Albrecht pt is having \"pressure in her chest\", states that its not as bad as the \"sharp pain\" she was feeling earlier. She also states that she is having pain in her jacobsen, bladder scanned pt and found 75mL-200 cc residual will let MD know. [id number] Spoke with daughter, updated her pt. Will be in later this morning. Would like to talk to case management on Monday to discuss SNF/Rehab. Pt's daughter also states that her reaction to Tylenol makes pt \"loopy\". Paged MD    [phone] with Hans Albrecht about chest pain and pain d/t jacobsen. Orders for stat EKG, one time order of sublingual Nitro and to flush jacobsen. 1153 Pt states that she has a headache, 02 sats 99%, pt to take O2 off and use as needed. BP 94/32 on machine and 110/40 manually- held Ketan Billings aware will see pt     [id number] Spoke to, wrote and called pharmacy on multiple occassions to try to obtain Imdur from the pyxis. They state that they went up there to fix the problem, however it would not let me pull the medication. [address] came back up to see why I was still unable to pull medication, manually gave me a pill. Missed dose.

## 2017-03-06 NOTE — INTERDISCIPLINARY ROUNDS
The Interdisciplinary Rounding Team met with the patient in their room and the plan of care was discussed.

## 2017-03-06 NOTE — ROUTINE PROCESS
Bedside and Verbal shift change report given to Elijah Beauchamp RN (oncoming nurse) by Rick Cheung RN (offgoing nurse). Report included the following information SBAR, Kardex, OR Summary, Procedure Summary, Intake/Output, MAR, Accordion, Recent Results and Med Rec Status.

## 2017-03-06 NOTE — PROGRESS NOTES
Met and spoke with patient and daughter at bedside,foc completed for [facility],bed booked will need a UAI signed from physician.

## 2017-03-06 NOTE — PROGRESS NOTES
Problem: Self Care Deficits Care Plan (Adult)  Goal: *Acute Goals and Plan of Care (Insert Text)  Occupational Therapy Goals  Initiated 3/6/2017 within 7 day(s). 1. Patient will perform grooming with supervision/set-up 2. Patient will perform upper body dressing with supervision/set-up. 3. Patient will perform lower body dressing with supervision/set-up. 4. Patient will perform toilet transfers with supervision/set-up. 5. Patient will perform all aspects of toileting with supervision/set-up. 6. Patient will participate in upper extremity therapeutic exercise/activities with supervision/set-up for 10 minutes. 7. Patient will utilize energy conservation techniques during functional activities with verbal cues. Outcome: Progressing Towards Goal  OCCUPATIONAL THERAPY EVALUATION     Patient: Nia Vargas (1 y.o. female)  Date: 3/6/2017  Primary Diagnosis: Altered mental status  SIRS (systemic inflammatory response syndrome) (HCC)  Aspiration pneumonia (HCC)  Urinary retention        Precautions:   Fall      ASSESSMENT :  Based on the objective data described below, the patient presents with decreased functional strength, decreased functional balance, decreased overall activity tolerance limiting independence with ADLs. Pt supine in bed upon entering nasal canula off pt, agreeable to therapy. Pt completed supine to sit transfer with mod A. Pt donned robe with min A. Pt required max A to don socks. Pt completed UE exercises while seated EOB to improve UE strength and overall activity tolerance. Pt required mod-max A to perform sit to stand transfer. Pt completed side steps at EOB with min A using RW. Pt returned to supine with min A. SpO2 checked upon returning supine reading 82-84%, 1L of supplemental O2 placed on pt and SpO2 increased to 90%. Once returned to supine, pt c/o \"not feeling right\" and the room spinning. HOB lowered and pt reported improvement in symptoms. RN notified.  Pt would benefit from continued OT services to improve safety and independence with ADL tasks/transfers. Education: Role of OT in acute care, plan of care     Patient will benefit from skilled intervention to address the above impairments. Patients rehabilitation potential is considered to be Good  Factors which may influence rehabilitation potential include:   [ ]             None noted  [X]             Mental ability/status  [X]             Medical condition  [ ]             Home/family situation and support systems  [ ]             Safety awareness  [ ]             Pain tolerance/management  [ ]             Other:        PLAN :  Recommendations and Planned Interventions:  [X]               Self Care Training                  [X]        Therapeutic Activities  [X]               Functional Mobility Training    [ ]        Cognitive Retraining  [X]               Therapeutic Exercises           [X]        Endurance Activities  [X]               Balance Training                   [X]        Neuromuscular Re-Education  [ ]               Visual/Perceptual Training     [X]   Home Safety Training  [X]               Patient Education                 [X]        Family Training/Education  [ ]               Other (comment):     Frequency/Duration: Patient will be followed by occupational therapy 3-5 times a week to address goals.   Discharge Recommendations: [facility] Fu[facility]  Further Equipment Recommendations for Discharge: TBD by next level of care       SUBJECTIVE:   Patient stated .      OBJECTIVE DATA SUMMARY:       Past Medical History:   Diagnosis Date    Arrhythmia       pt reports a heart murmur    Chronic obstructive pulmonary disease ([address]       hx of pneumonia    Diabetes ([facility] [address] 2003    Diarrhea      Hypertension       takes B/P meds to protect kidneys    Liver disease       testing for cirrhosis    Osteoarthritis      Pneumonia 2010     Past Surgical History:   Procedure Laterality Date    BREAST SURGERY PROCEDURE UNLISTED Bilateral       fibroid cysts    HX CHOLECYSTECTOMY        HX HYSTERECTOMY        HX MOHS PROCEDURES        HX ORTHOPAEDIC         Barriers to Learning/Limitations: yes;  altered mental status (i.e.Sedation, Confusion)  Compensate with: visual, verbal, tactile, kinesthetic cues/model  GCODES (GO)Self Care  Current  CL= 60-79%   Goal  CJ= 20-39%. The severity rating is based on the Other modified barthel index  Prior Level of Function/Home Situation: I with ADLs  Home Situation  Home Environment: Private residence  # Steps to Enter: 8  Rails to Enter: Yes  Hand Rails : Bilateral  Wheelchair Ramp: No  One/Two Story Residence: One story  Living Alone: No  Support Systems: Child(marcos)  Patient Expects to be Discharged to[de-identified] Rehabilitation facility  Current DME Used/Available at Home: Dorn Olszewski, rollator, [address] [employer] chair, Grab bars  Tub or Shower Type: Shower     Cognitive/Behavioral Status:  Neurologic State: Alert  Orientation Level: Oriented to person  Cognition: Follows commands  Safety/Judgement: Fall prevention;Decreased insight into deficits  Coordination:  Coordination: Generally decreased, functional  Fine Motor Skills-Upper: Left Intact; Right Intact    Gross Motor Skills-Upper: Left Intact; Right Intact  Balance:  Sitting: Intact  Standing: Impaired; With support  Standing - Static: Fair  Standing - Dynamic : Fair  Strength:  Strength: Generally decreased, functional     Tone & Sensation:  Tone: Normal  Sensation: Intact  Range of Motion:  AROM: Generally decreased, functional  PROM: Generally decreased, functional     Functional Mobility and Transfers for ADLs:  Bed Mobility:  Supine to Sit: Moderate assistance  Sit to Supine: Minimum assistance     Transfers:  Sit to Stand: Moderate assistance;Maximum assistance                 ADL Assessment:   Upper Body Dressing: Minimum assistance     Lower Body Dressing: Maximum assistance; Total assistance     ADL Intervention:  Upper Body Dressing Assistance  Dressing Assistance: Minimum assistance  Front Opened Shirt: Minimum assistance     Lower Body Dressing Assistance  Dressing Assistance: Maximum assistance  Slip on Shoes with Back: Maximum assistance     Cognitive Retraining  Safety/Judgement: Fall prevention;Decreased insight into deficits     Therapeutic Exercise:  BUE AROM x15 reps: chest presses     Pain:  Pre-treatment: 0  Post-treatment: 0  Activity Tolerance:   fair  Please refer to the flowsheet for vital signs taken during this treatment. After treatment:   [ ] Patient left in no apparent distress sitting up in chair  [X] Patient left in no apparent distress in bed  [X] Call bell left within reach  [X] Nursing notified  [ ] Caregiver present  [ ] Bed alarm activated      COMMUNICATION/EDUCATION:   [ ] Home safety education was provided and the patient/caregiver indicated understanding. [X] Patient/family have participated as able in goal setting and plan of care. [X] Patient/family agree to work toward stated goals and plan of care. [ ] Patient understands intent and goals of therapy, but is neutral about his/her participation. [ ] Patient is unable to participate in goal setting and plan of care.      Thank you for this referral.  Lucila Ann MS OTR/L  Time Calculation: 39 mins

## 2017-03-06 NOTE — PROGRESS NOTES
Readmission Risk Assessment:     High Risk and MSSP/Good Help ACO patients    RRAT Score:  21 or greater    Initial Assessment:chart reviewed pt admitted from home due  to fall and altered mental status,temp over 102 on admission,PT recommendation for home health services at this time cm will cont to follow thru and discuss plan of care with patient and family. Emergency Contact: see chart     Pertinent Medical Hx:    See chart    PCP/Specialists:  Miki Chavira Soup:      DME:         High Risk Care Transition Plan:  1. Evaluate for [facility] or [facility], SNF, acute rehab, community care coordination of Resources. 2. Involve patient/caregiver in assessment, planning, education and implement of intervention. 3. CM daily patient care huddles/interdisciplinary rounds. 4. PCP/Specialist appointment within 48 hours of discharge unless otherwise specified. 5. Facilitate transportation and logistics for follow-up appointments. 6. Medication reconciliation [address]  7. Discharge follow-up phone call within 2  4 days (NN, [facility], Nursing) CM follow up as assigned. 8. Formal handoff between hospital provider and post-acute provider to transition patient  9. Handoff to [facility] Nurse Navigator or PCP practice.

## 2017-03-06 NOTE — DIABETES MGMT
Diabetes Patient/Family Education Record    Factors That  May Influence Patients Ability  to Learn or  Comply With  Recommendations:    []   Language barrier    []   Cultural needs   []   Motivation    []   Cognitive limitation    []   Physical   []   Education    []   Physiological factors   []   Hearing/vision/speaking impairment   []   Yazidi beliefs    []   Financial factors   []  Other:   []  No factors identified at this time.      Person Instructed:   [x]   Patient   [x]   Family (daughter)   []  Other     Preference for Learning:   [x]   Verbal   [x]   Written   []  Demonstration     Level of Comprehension & Competence:    [x]  Good       daughter                              [] Fair                                     []  Poor                             [x]  Needs Reinforcement   []  Teachback completed    Education Component:   [x]  Medication management, including confirmation of home regimen    []  Nutritional management   []  Exercise   [x]  Signs, symptoms, and treatment of hypoglycemia   [x]  Prevention, recognition and treatment of hypoglycemia   [x]  Importance of blood glucose monitoring and the need to check BG before the administration of insulin   []  Instruction on use of blood glucose meter   [x]  Discuss the importance of HbA1C monitoring and provide patient with  results   []  Sick day guidelines   []  Proper use and disposal of lancets, needles, syringes or insulin pens (if appropriate)   []  Potential long-term complications (retinopathy, kidney disease, neuropathy, heart disease, stroke, vascular disease, foot care)   [] Provide emergency contact number and contact number for more information    []  Goal:  Patient/family will demonstrate understanding of Diabetes Self Management Skills by: (date) 3/21  Plan for post-discharge education or self-management support:    [] Outpatient class schedule provided            [] Patient Declined    [] Scheduled for outpatient classes (date) _______         Olvin Calvillo RN, MS  Glycemic Control Team

## 2017-03-06 NOTE — WOUND CARE
Pt seen by wound care for delmi score of 14, skin intact, wound care will continue to follow pt per protocol during this hospitalization.

## 2017-03-06 NOTE — PROGRESS NOTES
Problem: Mobility Impaired (Adult and Pediatric)  Goal: *Acute Goals and Plan of Care (Insert Text)  Physical Therapy Goals  Initiated 3/5/2017 and to be accomplished within 5 day(s)  1. Patient will move from supine <> sit with S in prep for out of bed activity and change of position. 2. Patient will perform sit<> stand with S with rolling walker in prep for transfers/ambulation. 3. Patient will transfer from bed <> chair with CGA with rolling walker for time up in chair for completion of ADL activity. 4. Patient will ambulate 100 feet with rolling walker with CGA for increase functional mobility. 5. Patient will ascend/descend 8 steps with handrail(s) with minimal assistance/contact guard assist for home re-entry as needed. Outcome: Progressing Towards Goal  PHYSICAL THERAPY TREATMENT     Patient: Toma Wang (21 y.o. female)  Date: 3/6/2017  Diagnosis: Altered mental status  SIRS (systemic inflammatory response syndrome) (HCC)  Aspiration pneumonia (HCC)  Urinary retention <principal problem not specified>  Precautions: Fall   Chart, physical therapy assessment, plan of care and goals were reviewed. ASSESSMENT:  Pt continues to require significant assistance with all aspects of mobility. Ambulation distance limited as pt fatigues quickly, and also reporting increase pain to RLE. O2 82% on RA post ambulation. Pt reporting increase dizziness, therefore assistance back to supine. Applied O2. Pt continues to report dizziness however slowly improving. Nursing aide notified as nurse not available at this time. Cont POC. Progression toward goals:  [ ]      Improving appropriately and progressing toward goals  [X]      Improving slowly and progressing toward goals  [ ]      Not making progress toward goals and plan of care will be adjusted       PLAN:  Patient continues to benefit from skilled intervention to address the above impairments. Continue treatment per established plan of care.   Discharge Recommendations:  Rehab  Further Equipment Recommendations for Discharge:  rolling walker       SUBJECTIVE:   Patient stated  are you going to have that baby       OBJECTIVE DATA SUMMARY:   Critical Behavior:  Neurologic State: Alert  Orientation Level: Oriented to person  Cognition: Follows commands  Safety/Judgement: Fall prevention, Decreased insight into deficits  Functional Mobility Training:  Bed Mobility:  Supine to Sit: Moderate assistance  Sit to Supine: Minimum assistance  Transfers:  Sit to Stand: Moderate assistance;Maximum assistance  Stand to Sit: Moderate assistance;Maximum assistance  Balance:  Sitting: Intact  Standing: Impaired; With support  Standing - Static: Fair  Standing - Dynamic : Fair  Ambulation/Gait Training:  Distance (ft): 2 Feet (ft) (side steps )  Assistive Device: Walker, rolling;Gait belt  Ambulation - Level of Assistance: Minimal assistance  Gait Abnormalities: Decreased step clearance; Antalgic; Shuffling gait  Base of Support: Narrowed  Speed/Bethany: Slow;Shuffled  Step Length: Right shortened;Left shortened  Swing Pattern: Left asymmetrical;Right asymmetrical  Interventions: Verbal cues; Tactile cues; Safety awareness training     Pain:  Pain Scale 1: Numeric (0 - 10)  Pain Intensity 1: 0  Activity Tolerance:   Fair      After treatment:   [ ] Patient left in no apparent distress sitting up in chair  [X] Patient left in no apparent distress in bed  [X] Call bell left within reach  [ ] Nursing notified  [ ] Caregiver present  [ ] Bed alarm activated      Christin Kuhn PTA   Time Calculation: 39 mins

## 2017-03-06 NOTE — PROGRESS NOTES
Cardiology Progress Note        Patient: Will Primus        Sex: female          DOA: 3/4/2017  YOB: 1945      Age:  67 y.o.        LOS:  LOS: 2 days   Assessment/Plan     Active Problems:    Urinary retention (3/4/2017)      Aspiration pneumonia ([facility] (3/4/2017)      Altered mental status (3/4/2017)      SIRS (systemic inflammatory response syndrome) ([facility] (3/4/2017)        Plan:  Mild trop elevation  LV function is stable and will treat her conservatively due to multiple co morbidities and add aspirin and continue with beta blocker and isosorbide mononitrate. Isolated PVCs and maintain potassium >4.0 and magnesium> 2. I will sign off and please call if any questions/concerned. Subjective:    cc:    denied CP      REVIEW OF SYSTEMS: NO CP, SOB, N,V,D, F,C  Objective:      Visit Vitals    /51 (BP 1 Location: Left arm, BP Patient Position: At rest)    Pulse 62    Temp 98.2 °F (36.8 °C)    Resp 16    Ht 5' 5\" (1.651 m)    Wt 99 kg (218 lb 4.1 oz)    SpO2 96%    Breastfeeding No    BMI 36.32 kg/m2     Body mass index is 36.32 kg/(m^2). Physical Exam:  General Appearance: Comfortable,  HEENT: YANETH. HEAD: Atraumatic  NECK: No JVD, no thyroidomeglay. LUNGS: Clear bilaterally. HEART: S1+S2 audible, no murmur, no pericardial rub. ABD: Non-tender, BS Audible    EXT: No edema, and no cysnosis. VASCULAR EXAM: Pulses are intact. PSYCHIATRIC EXAM: Mood is appropriate.     Medication:  Current Facility-Administered Medications   Medication Dose Route Frequency    [START ON 3/7/2017] insulin glargine (LANTUS) injection 30 Units  30 Units SubCUTAneous DAILY    insulin lispro (HUMALOG) injection 7 Units  7 Units SubCUTAneous TIDAC    carvedilol (COREG) tablet 25 mg  25 mg Oral BID    isosorbide mononitrate ER (IMDUR) tablet 30 mg  30 mg Oral DAILY    sodium chloride (NS) flush 5-10 mL  5-10 mL IntraVENous PRN    sodium chloride (NS) flush 5-10 mL  5-10 mL IntraVENous PRN    ondansetron (ZOFRAN) injection 4 mg  4 mg IntraVENous Q4H PRN    insulin lispro (HUMALOG) injection   SubCUTAneous AC&HS    glucose chewable tablet 16 g  4 Tab Oral PRN    glucagon (GLUCAGEN) injection 1 mg  1 mg IntraMUSCular PRN    dextrose (D50W) injection syrg 12.5-25 g  25-50 mL IntraVENous PRN    vitamin B complex tablet  1 Tab Oral DAILY    folic acid tablet 0.4 mg  400 mcg Oral DAILY    lactulose (CHRONULAC) solution 10 g  15 mL Oral DAILY    amylase-lipase-protease (CREON 55925) capsule 2 Cap  2 Cap Oral TID WITH MEALS    levoFLOXacin (LEVAQUIN) 500 mg in D5W IVPB  500 mg IntraVENous Q24H               Lab/Data Reviewed: All lab results for the last 24 hours reviewed.      Recent Labs      03/06/17 0532  03/05/17   0601  03/04/17   0734   WBC  4.2*  3.1*  5.6   HGB  10.3*  10.7*  13.3   HCT  32.3*  33.9*  41.7   PLT  68*  63*  82*     Recent Labs      03/06/17   0532  03/05/17   0601  03/04/17   0734   NA  139  141  142   K  3.6  3.6  3.7   CL  103  106  100   CO2  27  28  30   GLU  175*  164*  287*   BUN  17  18  21*   CREA  0.96  1.05  1.33*   CA  8.0*  8.1*  9.6       Signed By: Clint Retana MD     March 6, 2017

## 2017-03-06 NOTE — PROGRESS NOTES
0800 Assumed pt care. Pt alert to self and maintained on oxygen 1 liter. No acute distress. 1100 Interdisciplinary team rounds were held 3/6/2017 with the following team members:Care Management and Nursing and The physician, Dr Corrales Emperor the patient and spouse. Plan of care discussed. See clinical pathway and/or care plan for interventions and desired outcomes. Plan to discontinue jacobsen and wean off oxygen. Will f/u. Family @  bedside. 1500 Jacobsen pulled. 1630 Pt SPO2 on RA, is 87%. Placed back on 2 liters and SPO2 went up to 94% on 1 liter    1830 Pt spent fair day. More interactive and jacobsen was pulled and had not voided. Attempted to wean off oxygen but desalts in the 86's on RA. Back on 1 liter. HOB remain elevated. No acute distress. Endorse to incoming to follow up.

## 2017-03-06 NOTE — CONSULTS
[address] [place] [address] Rd    Name:  Shobha Caldwell  MR#:  [id number]  :  1945  Account #:  [de-identified]  Date of Adm:  2017  Date of Consultation:  2017      CARDIOLOGY CONSULTATION    PRIMARY CARE PHYSICIAN: Dr. Lorne Cardenas REQUESTING CONSULTATION: Dr. Yara Pete: Abnormal troponin. The patient with  complaints of chest pain. HISTORY OF PRESENT ILLNESS: The patient is a 69-year-old white  female with history of diabetes, cirrhosis, chronic diarrhea,  hypertension, chronic kidney disease, who is a DNR and was admitted  because of problems with confusion, presumably due to sepsis. It was  unclear if the infection was from urinary tract infection or if she had  developed an aspiration pneumonia. The patient is an extremely poor  historian and reports having some chest pains, but she also states that  pain seems to also be related to some left-sided rib pain and back  pain. Her daughter said that she has been complaining of some chest  discomfort for the past couple weeks. The patient leads a very  sedentary lifestyle. She was also noted to have a new left bundle  branch block on her EKG since her last tracing from 2016. Her  CPK-MBs were negative, but she had a very mild rise in her serum  troponin, which was initially 0.13, jade to a peak  of 0.43 and subsequently came back down to 0. 16. This rise could be  related to her sepsis (patient's temperature was 103.3 on admission). She did complain of some chest pain yesterday, but states it was sharp  in nature and seemed to be more musculoskeletal in nature. The  patient is presently pain-free. She denies congestive heart failure  symptoms. She denies any prior history of cardiac arrhythmias. She  has not any problems with near syncopal or syncopal spells.     PAST MEDICAL HISTORY: Cirrhosis, diabetes, hypertension,  recurrent urinary tract infections, chronic kidney disease,  thrombocytopenia, COPD, history of heart murmur, status post  cholecystectomy, status post hysterectomy. HOME MEDICATIONS  1. Insulin as directed. 2. Coreg 12.5 mg p.o. b.i.d.  3. Celebrex 100 mg p.o. b.i.d.  4. Lexapro 10 mg p.o. daily. 5. Cozaar 50 mg p.o. daily. 6. Folic acid 1 tablet p.o. daily. ALLERGIES  1. DARVON. 2. PENICILLIN. 3. TYLENOL. FAMILY HISTORY: Noncontributory. SOCIAL HISTORY: The patient does not smoke cigarettes or abuse  alcohol. REVIEW OF SYSTEMS: Negative for 10-system review except as  stated above. PHYSICAL EXAMINATION  GENERAL: The patient is an obese white female, appearing older than  her stated age, no acute distress. VITAL SIGNS: Reveal her to have a blood pressure of 127/53, pulse of  62, respiratory rate 18, temperature is 100.4 degrees. HEENT: Normocephalic/atraumatic. No scleral icterus. NECK: Without increased JVD or carotid bruits. CARDIAC: Exam revealed a regular rate and rhythm, with normal S1,  S2. There is a grade 0/7-0/6 systolic ejection murmur at the left sternal  border. PMI was nonpalpable (diffuse). LUNGS: Clear to auscultation and percussion. ABDOMEN: Soft, nontender, with normoactive bowel sounds. Abdomen is obese. No organomegaly was noted. EXTREMITIES: Without any clubbing, cyanosis, or edema. NEUROLOGIC: Nonfocal.  GENITOURINARY/RECTAL: Deferred. ASSESSMENT: The patient is a difficult historian and certainly she  should be managed conservatively, given her multiple medical  problems, sedentary lifestyle, and her being a DNR/DNI. She has not  recently sustained a myocardial infarction since her CPK-MBs are  normal. The etiology of her mild rise in serum troponin is not entirely  clear, but may be just related to sepsis. She does give a \"history\" of  some chest pain, but not entirely clear that it is cardiac in origin. Would  continue her Coreg and increase the dose as you have already done.   Will try low-dose long-acting nitrates to see if that will have an effect on  her \"chest pain. \" An echocardiogram has already been ordered for  tomorrow. The patient will be seen in followup by Dr. Marbin Langston.         MD Kelly Blakely / Tavo Arellano  D:  03/05/2017   14:45  T:  03/05/2017   21:17  Job #:  [id number]

## 2017-03-06 NOTE — ROUTINE PROCESS
Bedside and Verbal shift change report given to Jennifer Price RN (oncoming nurse) by SR (offgoing nurse).  Report included the following information SBAR, Kardex, Intake/Output, MAR, Recent Results and Cardiac Rhythm SR.

## 2017-03-06 NOTE — DIABETES MGMT
GLYCEMIC CONTROL AND NUTRITION PROGRESS NOTE:    Assessment/Recommendations:  -pt with known h/o of T2DM, BG out of range this admission  -education given  TDD = 30 units (Lantus 20 units + Humalog 10 units corrective coverage)  -previous admissions pt has responded well to mealtime dose of Humalog (12/16)  Recommendation:     *add meal time dose of Humalog 5 units AC TID    Subjective:  -patients daughter able to confirm home regimen, uses insulin pens and rotates sites around abdomen  -daughter states patient has been ill with infections since December of last year, BG out of range as a result  -daughter reports pt sometimes forgets to check BG during the day, denies hypoglycemic events  -pt verbalizes poor appetite as of late due to GI upset and infection    Goal:  - BG will be in target range of  mg/dl (non-ICU) by 3/11  -PO intake will average 75% of meals offered by 3/11    Most recent blood glucose values:   Lab Results   Component Value Date/Time     (H) 03/06/2017 05:32 AM    GLUCPOC 197 (H) 03/06/2017 06:24 AM    GLUCPOC 178 (H) 03/05/2017 09:09 PM    GLUCPOC 224 (H) 03/05/2017 04:39 PM         Current A1C is equivalent to average blood glucose of 206 mg/dl over the past 2-3 months. Lab Results   Component Value Date/Time    Hemoglobin A1c 8.8 03/04/2017 03:00 PM     Current hospital diabetes medications:   Lantus 20 units daily  Humalog corrective coverage normal insulin sensitivity  Previous day's insulin requirements:   TDD = Lantus 20 units + 10 units corrective coverage  Home diabetes medications:  Lantus 40 units qam  Lantus 20 units q hs  Novolog 15 units AC TID       Body mass index is 36.32 kg/(m^2).   Last 3 Recorded Weights in this Encounter    03/04/17 0737 03/04/17 0746 03/05/17 0918   Weight: 96.2 kg (212 lb) 96.2 kg (212 lb) 99 kg (218 lb 4.1 oz)    Ht Readings from Last 1 Encounters:   03/04/17 5' 5\" (1.651 m)       Diet:    Active Orders   Diet    DIET DIABETIC CONSISTENT CARB Regular       Intake:   Patient Vitals for the past 100 hrs:   % Diet Eaten   03/06/17 0943 25 %   03/05/17 1911 40 %   03/05/17 1612 75 %   03/05/17 0820 40 %   03/04/17 2327 50 %   03/04/17 1745 25 %   03/04/17 1315 0 %       Education:  _x___Refer to Diabetes Education Record             ____Education not indicated at this time        Jose E Conley RN, MS

## 2017-03-06 NOTE — PROGRESS NOTES
Hospitalist Progress Note    Patient: Maebelle Aschoff MRN: [id number]  CSN: [id number]    YOB: 1945  Age: 67 y.o. Sex: female    DOA: 3/4/2017 LOS:  LOS: 2 days          Chief Complaint:    Sepsis with UTI      Assessment/Plan     1. Sepsis due to UTi v possible aspiration pneumonia-await urine cx results  2. Cirrhosis  3. DM2-insulin dependent, uncontrolled-inc basal dose and add premeal insulin TID  4. HTN  5. Minimal troponin elevation has improved, no c/o CP now  6. New left bundle branch block, doubt MI currently  7. CKD 3  8. Portal hypertension  9. thrombocytopenia due to #2  10. PCN allergy  11. 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  Vision impairment    D/c jacobsen  Make adjustments in insulin coverage  Treat UTI  Discussed with daughter at bedside-she will need acute rehab/SNF for d/c  PT here  Recheck ammonia  Wean from 02 and see if sats are stable on RA-anticiopating 24-48 hrs more in hospital    Patient Active Problem List   Diagnosis Code    Chronic diarrhea K52.9    Cirrhosis of liver ([facility] K74.60    Type II diabetes mellitus ([facility] E11.9    Pancreatic mass K86.9    HTN (hypertension) I10    Pancreatic insufficiency K86.89    UTI (urinary tract infection) N39.0    Sepsis ([facility] A41.9    Acute metabolic encephalopathy S40.06    CKD (chronic kidney disease) stage 3, GFR 30-59 ml/min N18.3    Thrombocytopenia (HCC) D69.6    History of stroke Z86.73    Dehydration E86.0    Urinary retention R33.9    Aspiration pneumonia (HCC) J69.0    Altered mental status R41.82    SIRS (systemic inflammatory response syndrome) ([facility]) R65.10       Subjective:    Confused still  Pleasant, she was eating omelet this am on rounds  No overnigth events  Weak, has not walked really since admission  Jacobsen still in    Review of systems:    Constitutional: denies fevers, chills, myalgias  Respiratory: denies SOB, cough  Cardiovascular: denies chest pain, palpitations  Gastrointestinal: denies nausea, vomiting, diarrhea      Vital signs/Intake and Output:  Visit Vitals    /45 (BP 1 Location: Left arm, BP Patient Position: At rest)    Pulse (!) 57    Temp 98.5 °F (36.9 °C)    Resp 16    Ht 5' 5\" (1.651 m)    Wt 99 kg (218 lb 4.1 oz)    SpO2 93%    Breastfeeding No    BMI 36.32 kg/m2     Current Shift:  03/06 0701 - 03/06 1900  In: 120 [P.O.:120]  Out: -   Last three shifts:  03/04 1901 - 03/06 0700  In: 1263.8 [P.O.:920; I.V.:343.8]  Out: 1400 [Urine:1400]    Exam:    General:elderly WF, alert, NAD, OX1  Head/Neck: NCAT, supple, No masses, No lymphadenopathy  CVS:Regular rate and rhythm, no M/R/G, S1/S2 heard, no thrill  Lungs:Clear to auscultation bilaterally, no wheezes, rhonchi, or rales  Abdomen: Soft, Nontender, No distention, Normal Bowel sounds, No hepatomegaly  Extremities: No C/C/E, pulses palpable 2+  Skin:normal texture and turgor, no rashes, no lesions  Neuro:grossly normal , follows commands  Psych:appropriate                Labs: Results:       Chemistry Recent Labs      03/06/17   0532  03/05/17   0601  03/04/17   0734   GLU  175*  164*  287*   NA  139  141  142   K  3.6  3.6  3.7   CL  103  106  100   CO2  27  28  30   BUN  17  18  21*   CREA  0.96  1.05  1.33*   CA  8.0*  8.1*  9.6   AGAP  9  7  12   BUCR  18  17  16   AP  52  55  78   TP  6.1*  6.2*  8.1   ALB  2.7*  2.9*  4.0   GLOB  3.4  3.3  4.1*   AGRAT  0.8  0.9  1.0      CBC w/Diff Recent Labs      03/06/17   0532  03/05/17   0601  03/04/17   0734   WBC  4.2*  3.1*  5.6   RBC  3.51*  3.63*  4.50   HGB  10.3*  10.7*  13.3   HCT  32.3*  33.9*  41.7   PLT  68*  63*  82*   GRANS  62  68  82*   LYMPH  25  17*  7*   EOS  3  4  2      Cardiac Enzymes Recent Labs      03/05/17   0601  03/04/17   2120   CPK  113  135   CKND1  Cannot be calulated  0.7      Coagulation No results for input(s): PTP, INR, APTT in the last 72 hours.     No lab exists for component: INREXT    Lipid Panel No results found for: CHOL, CHOLPOCT, CHOLX, CHLST, CHOLV, [id number], HDL, LDL, NLDLCT, DLDL, LDLC, DLDLP, 721184, VLDLC, VLDL, TGL, TGLX, TRIGL, [id number], TRIGP, TGLPOCT, [id number], CHHD, CHHDX   BNP No results for input(s): BNPP in the last 72 hours.    Liver Enzymes Recent Labs      03/06/17   0532   TP  6.1*   ALB  2.7*   AP  52   SGOT  23      Thyroid Studies Lab Results   Component Value Date/Time    TSH 1.43 12/06/2016 07:00 AM        Procedures/imaging: see electronic medical records for all procedures/Xrays and details which were not copied into this note but were reviewed prior to creation of Peewee Lemon MD

## 2017-03-06 NOTE — PROGRESS NOTES
conducted a visit with Mami Garland, who is a 67 y.o.,female. The  provided the following Interventions:  Initiated a relationship of care and support. Offered prayer and assurance of continued prayers on patient's behalf. Plan:  Chaplains will continue to follow and will provide pastoral care on an as needed/requested basis.  recommends bedside caregivers page  on duty if patient shows signs of acute spiritual or emotional distress. Rev. GIANNI ParisDiv. Spiritual Care Dept.   [phone]

## 2017-03-07 NOTE — DISCHARGE INSTRUCTIONS
Lab Results   Component Value Date/Time    Hemoglobin A1c 8.8 03/04/2017 03:00 PM          Managing Side Effects of Chemotherapy: Care Instructions  Your Care Instructions  Cancer is often treated with medicines that destroy the cancer cells (chemotherapy). These medicines may slow cancer growth and prevent or stop the spread of cancer. Chemotherapy also can affect healthy cells and cause side effects. Most people can work and do their normal activities after and even during chemotherapy, but they may need to limit their schedules. Side effects of chemotherapy may include nausea and vomiting, loss of appetite, pain, and being tired. Some medicines can cause diarrhea or mouth sores. Your doctor may prescribe medicines to treat the side effects. Your doctor will advise you to take extra care to prevent illnesses and infections, because chemotherapy weakens your natural defenses. Follow-up care is a key part of your treatment and safety. Be sure to make and go to all appointments, and call your doctor if you are having problems. It's also a good idea to know your test results and keep a list of the medicines you take. How can you care for yourself at home? Medicines  · Take your medicines exactly as prescribed. Call your doctor if you think you are having a problem with your medicine. You may get medicine for nausea and vomiting if you have these side effects. Nausea and vomiting  · A light meal or snack before chemotherapy may help prevent nausea. If you do have nausea during your treatment, try eating earlier--at least an hour or two before your next treatment. After your treatment, you may want to wait one or more hours before you eat again. · Drink fluids with your meals and an hour before or after meals. · After vomiting has stopped for 1 hour, sip a rehydration drink, such as Powerade or Gatorade. · Drink plenty of fluids to prevent dehydration.  Choose water and other caffeine-free clear liquids until you feel better. Try clear fluids, such as apple or grape juice mixed to half strength with water, rehydration drinks, weak tea with sugar, clear broth, and gelatin dessert. Do not drink citrus juices. If you have kidney, heart, or liver disease and have to limit fluids, talk with your doctor before you increase the amount of fluids you drink. · When you are feeling better, begin eating clear soups and mild foods until all symptoms are gone for 12 to 48 hours. Other good choices include dry toast, crackers, cooked cereal, and gelatin dessert, such as Jell-O.  · If your vomiting is not getting better or is getting worse, call your doctor right away. Loss of appetite  · It's important to eat healthy food. If you do not feel like eating, try to eat food that has protein and extra calories to keep up your strength and prevent weight loss. You can drink liquid meal replacements for extra calories and protein. · Try eating several smaller meals throughout the day. Set a schedule for meals and snacks, and plan for times when it feels best to eat. Try to eat your main meal early. · After treatment, you may want to wait for a while to eat. You can also try eating earlier before treatment. · Try to eat more of the foods you like during the days and times when your appetite is good. · When you don't feel like eating your normal foods, try clear broths/soups and mild foods like toast, crackers, cooked cereal like oatmeal, and gelatin dessert. Eating soft, bland foods may help. Pain control  · If your doctor prescribes medicines to control pain, take them as directed. Often your doctor will have you take these medicines regularly to keep your pain under control. Medicine for pain may cause side effects. Let your doctor know if you feel constipated, have trouble urinating, or have nausea. · Try using relaxation exercises to lower your anxiety and stress, which can increase pain.   · Keep track of your pain so you can tell your doctor what your pain is like. Write down where you feel pain, how long it lasts, what seems to bring it on, and how it feels. Also note what makes the pain feel better or worse. · If you have mouth pain, your doctor may prescribe a special mouth rinse that can help relieve the pain. Weakness and feeling tired  · Get extra rest. Plan ahead so you can take breaks or naps. · Save your energy for the most important things you want to do. · Try to get some exercise, such as walking, but stop if you are too tired. · Eat a balanced diet. Do not skip meals, especially breakfast.  · Try to lower your stress and workload. Relaxation exercises, music therapy, and prayer are ways to lower stress and help you relax. · Ask family and friends to help with home chores and other tasks. To prevent infections  · Wash your hands often during the day, especially before you eat and after you use the bathroom. · Stay away from people who have illnesses that you might catch, such as the flu or a cold. · Try to stay out of crowds. · Clean cuts and scrapes right away with warm water and soap. Clean them daily until they are healed. · Keep track of your temperature, if your doctor recommends it. You can do this by taking your temperature at regular times and writing it down. Hair loss  · Use a mild shampoo and a soft hair brush. · Use a low setting on your hair dryer. Do not color or perm your hair. · Have your hair cut short. It will look thicker and rogers, and it will not be such a shock if you lose hair. · Use sunscreen and a hat, scarf, or turban to protect your scalp from the sun. · Ask your doctor about other treatments that you may try to prevent or minimize hair loss. These may include the use of a cooling cap. When should you call for help? Call 911 anytime you think you may need emergency care. For example, call if:  · You pass maroon or very bloody stools.   · You have a severe headache or changes in your vision. · You passed out (lost consciousness). Call your doctor now or seek immediate medical care if:  · You are short of breath. · You have new or severe pain. · You have chills, a fever, or a cough. · Your symptoms, such as nausea or feeling tired, get worse. · Your stools are black and tarry or have streaks of blood. · You have severe diarrhea that is not getting better. · You have unusual bruising or bleeding. · Your pain is not controlled with your medicine. · You are dizzy or lightheaded, or you feel like you may faint. Watch closely for changes in your health, and be sure to contact your doctor if:  · You have sores in your mouth. Where can you learn more? Go to http://gissell-mony.info/. Enter (386) 8820-523 in the search box to learn more about \"Managing Side Effects of Chemotherapy: Care Instructions. \"  Current as of: August 5, 2016  Content Version: 11.1  © 1501-3047 [employer]. Care instructions adapted under license by [employer] (which disclaims liability or warranty for this information). If you have questions about a medical condition or this instruction, always ask your healthcare professional. [place] [address] any warranty or liability for your use of this information. Altered Mental Status: Care Instructions  Your Care Instructions  Altered mental status is a change in how well your brain is working. As a result, you may be confused, be less alert than usual, or act in odd ways. This may include seeing or hearing things that aren't really there (hallucinations). A mental status change has many possible causes. For example, it may be the result of an infection, an imbalance of chemicals in the body, or a chronic disease such as diabetes or COPD. It can also be caused by things such as a head injury, taking certain medicines, or using alcohol or drugs. The doctor may do tests to look for the cause.  These tests may include urine tests, blood tests, and imaging tests such as a CT scan. Sometimes a clear cause isn't found. But tests can help the doctor rule out a serious cause of your symptoms. A change in mental status can be scary. But mental status will often return to normal when the cause is treated. So it is important to get any follow-up testing or treatment the doctor has suggested. The doctor has checked you carefully, but problems can develop later. If you notice any problems or new symptoms, get medical treatment right away. Follow-up care is a key part of your treatment and safety. Be sure to make and go to all appointments, and call your doctor if you are having problems. It's also a good idea to know your test results and keep a list of the medicines you take. How can you care for yourself at home? · Be safe with medicines. Take your medicines exactly as prescribed. Call your doctor if you think you are having a problem with your medicine. · Have another adult stay with you until you are better. This can help keep you safe. Ask that person to watch for signs that your mental status is getting worse. When should you call for help? Call 911 anytime you think you may need emergency care. For example, call if:  · You passed out (lost consciousness). Call your doctor now or seek immediate medical care if:  · Your mental status is getting worse. · You have new symptoms, such as a fever, chills, or shortness of breath. · You do not feel safe. Watch closely for changes in your health, and be sure to contact your doctor if:  · You do not get better as expected. Where can you learn more? Go to [employer]olog[employer].be  Enter J452 in the search box to learn more about \"Altered Mental Status: Care Instructions. \"   © 7140-4779 [employer]. Care instructions adapted under license by [employer] Dominguez (which disclaims liability or warranty for this information).  This care instruction is for use with your licensed healthcare professional. If you have questions about a medical condition or this instruction, always ask your healthcare professional. [place] [address] any warranty or liability for your use of this information. Content Version: 82.8.487815; Current as of: November 20, 2015          This lab test reflects that your blood sugar has been slightly elevated over the past 3 months and should be evaluated by your primary care provider. An A1C of 5.7-6.4% meets the criteria for pre-diabetes; an A1C of 6.5% or higher meets the criteria for diabetes. This lab test reflects that your blood sugar averaged 206 mg/dl over the past 3 months. It is important to follow up with your provider on a routine basis to continue to evaluate your blood sugar and discuss any necessary changes in treatment.

## 2017-03-07 NOTE — ROUTINE PROCESS
TRANSFER - OUT REPORT:    Verbal report given to Aron Pimentel LPN (name) on Eliane Acuna  being transferred to [facility] (unit) for routine progression of care       Report consisted of patients Situation, Background, Assessment and   Recommendations(SBAR). Information from the following report(s) SBAR, Kardex, Intake/Output and MAR was reviewed with the receiving nurse. Lines:       Opportunity for questions and clarification was provided.       Patient transported with:   O2 @ 2 liters/ ambulance

## 2017-03-07 NOTE — ROUTINE PROCESS
Bedside and Verbal shift change report given to THOMAS Hollingsworth  RN (oncoming nurse) by Yinka Avery RN  (offgoing nurse). Report included the following information SBAR, Kardex, Cardiac Rhythm SR and Alarm Parameters .

## 2017-03-07 NOTE — DIABETES MGMT
NUTRITION ASSESSMENT / [address]           67 y.o.              Patient Active Problem List   Diagnosis Code    Chronic diarrhea K52.9    Cirrhosis of liver ([facility] K74.60    Type II diabetes mellitus ([facility] E11.9    Pancreatic mass K86.9    HTN (hypertension) I10    Pancreatic insufficiency K86.89    UTI (urinary tract infection) N39.0    Sepsis (HCC) A41.9    Acute metabolic encephalopathy U14.85    CKD (chronic kidney disease) stage 3, GFR 30-59 ml/min N18.3    Thrombocytopenia (HCC) D69.6    History of stroke Z86.73    Dehydration E86.0    Urinary retention R33.9    Aspiration pneumonia ([facility]) J69.0    Altered mental status R41.82    SIRS (systemic inflammatory response syndrome) ([facility]) R65.10        INTERVENTIONS/PLAN:     - basal/bolus regimen orders in place, basal increased today and mealtime dose added yesterday, glycemic control improving, recommend continue with current regimen, will continue to monitor trends and make recommendations prn  - education provided (see note 3/6)  - encouraged OP DM Self Management Class (schedule given)    ASSESSMENT:   Nutritional Status: sporadic but adequate intake currently, morbid obesity, uncontrolled DM       Diabetes Management:     - TDD: 40 units (20 Lantus, 20 Humalog - 14 mealtime, 6 corrective)  - BG range: 130-219 mg/dl  - hypo: none      Lab Results   Component Value Date/Time     (H) 03/07/2017 05:50 AM    GLUCPOC 219 (H) 03/07/2017 11:24 AM    GLUCPOC 135 (H) 03/07/2017 06:32 AM    GLUCPOC 130 (H) 03/06/2017 09:29 PM           Within target range (non-ICU: <140; ICU<180): [] Yes   [x]  No    Current Insulin regimen:   - Lantus 30 units daily  - Humalog 7 units qac  - Humalog corrective coverage normal insulin sensitivity    Home medication/insulin regimen:   - Lantus 40 units qam  - Lantus 20 units q hs  - Novolog 15 units qac    HbA1c: equivalent  to ave BGlucose of 206 mg/dl for 2-3 months prior to admission    Lab Results   Component Value Date/Time    Hemoglobin A1c 8.8 03/04/2017 03:00 PM       Adequate glycemic control PTA:  [] Yes  [x] No       SUBJECTIVE/OBJECTIVE:   Information obtained from: (obtained 3/6)  -patient's daughter able to confirm home regimen, uses insulin pens and rotates sites around abdomen  -daughter states patient has been ill with infections since December of last year, BG out of range as a result  -daughter reports pt sometimes forgets to check BG during the day, denies hypoglycemic events  -pt verbalizes poor appetite as of late due to GI upset and infection    Diet:   Active Orders   Diet    DIET DIABETIC CONSISTENT CARB Regular       Patient Vitals for the past 100 hrs:   % Diet Eaten   03/07/17 1334 75 %   03/07/17 1015 100 %   03/06/17 1927 10 %   03/06/17 1244 100 %   03/06/17 0943 25 %   03/05/17 1911 40 %   03/05/17 1612 75 %   03/05/17 0820 40 %   03/04/17 2327 50 %   03/04/17 1745 25 %   03/04/17 1315 0 %         Medications: [x]                Reviewed        Labs:   Lab Results   Component Value Date/Time    Sodium 142 03/07/2017 05:50 AM    Potassium 3.6 03/07/2017 05:50 AM    Chloride 105 03/07/2017 05:50 AM    CO2 28 03/07/2017 05:50 AM    Anion gap 9 03/07/2017 05:50 AM    Glucose 145 03/07/2017 05:50 AM    BUN 17 03/07/2017 05:50 AM    Creatinine 0.97 03/07/2017 05:50 AM    Calcium 8.1 03/07/2017 05:50 AM    Magnesium 1.7 03/04/2017 03:00 PM    Phosphorus 3.7 08/31/2015 02:00 AM    Albumin 2.7 03/07/2017 05:50 AM       Anthropometrics: IBW : 62.6 kg (138 lb), % IBW (Calculated): 156.52 %, BMI (calculated): 35.9  Wt Readings from Last 1 Encounters:   03/07/17 98 kg (216 lb)    Ht Readings from Last 1 Encounters:   03/07/17 5' 5\" (1.651 m)       Estimated Nutrition Needs: 1575 Kcals/day (25 kcal/kg of IBW), Protein (g): 76 g (1.2 g/kg) Fluid (ml): 1575 ml (1 ml/kcal)  Based on:   []          Actual BW    [x]          IBW   []            Adjusted BW        Intake: Patient Vitals for the past 100 hrs:   % Diet Eaten   03/07/17 1334 75 %   03/07/17 1015 100 %   03/06/17 1927 10 %   03/06/17 1244 100 %   03/06/17 0943 25 %   03/05/17 1911 40 %   03/05/17 1612 75 %   03/05/17 0820 40 %   03/04/17 2327 50 %   03/04/17 1745 25 %   03/04/17 1315 0 %         Nutrition Diagnoses:   1. Nutrition Diagnosis 1: altered nutrition-related lab values Related to: DM2 Nutrition Diagnosis 1 Evidenced By: A1C of 8.8% and EAG of 206 mg/dl    2. Nutrition Diagnosis 2: Overweight/obesity Nutrition Diagnosis 2 Related to: h/o excessive energy intake Nutrition Diagnosis 2 Evidenced By: BMI of 36%    3. Nutrition Diagnosis 3: Self-monitoring deficit Nutrition Diagnosis 3 Related to: DM  Nutrition Diagnosis 3 Evidenced By: pt report of does not check NG regularly at home, forgetful at times    Nutrition Interventions:   Intervention :Food/Nutrient Delivery: Yes  Meals/Snacks: General/healthful diet (Consistent CHO)  Intervention: Nutrition Education: Yes  Intervention: Nutrition Counseling: Yes  Intervention: Coordination of Nutrition Care: Yes  Goal: BG will be within target range of  mg/dl by 3/11, PO intake will average 75% of meals offered by 3/11  Recommended Diet/Supplements: Continue current diet    Goal: Glucose will be within target range. Intervention :Food/Nutrient Delivery: Yes  Meals/Snacks: General/healthful diet (Consistent CHO)  Intervention: Nutrition Education: Yes  Intervention: Nutrition Counseling: Yes  Intervention: Coordination of Nutrition Care: Yes  Goal: BG will be within target range of  mg/dl by 3/11, PO intake will average 75% of meals offered by 3/11  Recommended Diet/Supplements: Continue current diet     Nutrition Monitoring and Evaluation      [x]     Monitor po intake on meal rounds  [x]     Continue inpatient monitoring and intervention  []     Other:      Nutrition Risk:  []   High     []  Moderate    [x]  Minimal/Uncompromised    GIANNI Quiroz MPH, RD

## 2017-03-07 NOTE — PROGRESS NOTES
0800 Assumed pt care. Alert and oriented x4 this morning. Daughter @ bedside. 1000 Up with physical therapy, bed linens changed, assisted to chair and tolerated well. Call light placed within reach and instructed to call. 1200 Order in chart to be discharged. Transportation set up for 1500    1230 Daughter Guinea-Georgesu made aware. 1640 Transportation escorted pt via stretcher by ambulance in stable condition out to [place].

## 2017-03-07 NOTE — DISCHARGE SUMMARY
[facility] SUMMARY    Name:  Bj Trevizo  MR#:  [id number]  :  1945  Account #:  [de-identified]  Date of Adm:  2017  Date of Discharge:  2017      CODE STATUS: DO NOT RESUSCITATE. ALLERGIES:  1. DARVON. 2. PENICILLIN. 3. TYLENOL. DISCHARGE MEDICATIONS:  1. Imdur 30 mg daily. 2. Doxycycline 100 mg BID X 7days  3. B complex 1 tablet daily. 4. Rocaltrol 0.25 mcg daily. 5. Calcium 600 mg daily. 6. Coreg 12.5 mg twice daily. 7. Celebrex 100 mg twice daily. 8. Folic acid 695 mcg daily. 9. Lactulose 15 mL daily. 10. Lantus 40 units in the morning, 15 units at night. 11. Lexapro 10 mg daily. 12. Cozaar 50 mg daily. 13. FlexPen Novolog 15 units before breakfast, lunch and dinner. 14. Osteo Bi-Flex 250 mg daily. 15. Zenpep lipase protease amylase capsules 2 caps 3 times daily with  Meals. 16.macrobid 100 mg BID X 7 days  17.nystatin powder to vaginal area TID X 7 days  18. Kdur 20 meQ daily X 7 days    ADA diet    CONSULTANTS: Dr. Gala De La Fuente, Cardiology. DIAGNOSES AT DISCHARGE:  1. Sepsis due to urinary tract infection versus possible aspiration  pneumonia. 2. Cirrhosis of the liver. 3. Insulin-dependent diabetes mellitus. 4. Hypertension. 5. Minimal troponin elevation. 6. New left bundle branch block, resolved. 7. Chronic kidney disease stage III. 8. Portal hypertension. 9. Thrombocytopenia. 10. Metabolic encephalopathy, improved. 11. Presbycusis. 12. Vision impairment. DISPOSITION: The patient is going to [facility] SUMMARY: This is a 48-year-old lady who lives at home  with her family. She has a multitude of medical issues including  diabetes, hypertension, chronic kidney disease, cirrhosis, chronic  osteoarthritis of the knees. She has a DO NOT RESUSCITATE CODE  status.  She came to the emergency room because of worsening  confusion and she had been hospitalized in December, but had  worsened over the prior week. She had been started on Macrobid for  a UTI. The morning of admission, she appeared to have fallen out of  bed with a significant amount of vomitus in the room. It was quite a  mess for her daughter. She was febrile, tachycardic, hypertensive. Clinically, she had evidence for sepsis. Her urinalysis showed WBCs,  bacteria and yeast and subsequently the patient was admitted to the  hospital, started on treatment for sepsis, antibiotics, and evaluation  with Cardiology was done for an elevation in her troponin, intermittent  chest pressure and \"new bundle branch block\". Cardiology saw her,  they recommended medical management and echocardiogram and  they started her on Imdur. They thought her troponin elevation was due  to sepsis. An echocardiogram was completed on the 4th, this showed  an EF of 50% to 55% with grade 1 diastolic dysfunction. So, overall  she has done fairly well. Her blood sugar in the last 24 hours has been  between 138 and 219. Her last WBC count 3.8. Hemoglobin and  hematocrit 10 and 30.5. Her platelets are stable at 69,000. Her last  chemistry shows a BUN and creatinine of 17 and 0.97. Her liver  enzymes are normal. Her last ammonia level was 35. Her potassium  3.6, chloride 105. Her blood culture from the 4th is no growth for 3  days. She had a urine culture on the 4th, that was no growth for 2  days. A second urine culture was done on the 4th, that has not  completely resulted. Her chest x-ray on admission showed mild  interstitial edema, stable cardiomegaly. Last EKG performed on the 5th  showed left bundle branch block to be no longer present, normal sinus  rhythm with sinus arrhythmia and left axis deviation. She had a  prolonged QT at 496. So at this point in time, she has been stable on  the telemetry unit, receiving intravenous Levaquin here. Will need to  continue antibiotic treatment. Today, she is sitting up in a chair.  She  is on 1 liter nasal cannula oxygen, she seems to have tolerated that  well. No chest pain or shortness of breath. She had a low-grade  temperature at 100.5 yesterday evening at 9:20 but no fever since. Heart rate is in the 50s to 60s. Blood pressure 112/48. She is saturating  96% on 1 liter. Her respiratory rate is 16. Her lungs sound clear. Her  lower extremities, 1+ edema. Cardiac exam regular rhythm, regular  rate. Neck is supple. Skin appears without rash and she is in good  spirits this morning, mentating much better than yesterday, oriented to  person and place today. So plan to discharge to the acute  rehabilitation at [facility] which was the family's first choice  for continued rehabilitation considering her debilitating arthritis,  recurrent UTIs, and generalized weakness. Continue the antibiotic  therapy for her urinary tract infection as well as doxy for her possible pneumonia, give one dose lasix prior to leaving to help mild volume overload possiblility. Continue the long-acting nitrate,  Imdur 30 mg daily as a new medicines per Cardiology's  recommendation. Continue her beta blocker, her basal insulin twice  daily as she was on prior to admission with her premeal insulin and  further recommendations per Cardiology are that she should be on  aspirin, continue with the beta blocker and isosorbide, maintain her  potassium greater than 4 and her magnesium greater than 2. Her last  potassium was 3.6, so will recommend a potassium supplement for her  for discharge and I would be cautious with aspirin in her considering  her low platelet count. Otherwise, she is stable for discharge from the  hospital today; 45 minutes on discharge time. She has a DO NOT  RESUSCITATE.         Nahun Lewis MD    RI / MS1  D:  03/07/2017   12:12  T:  03/07/2017   12:48  Job #:  [id number]

## 2017-03-07 NOTE — PROGRESS NOTES
Problem: Mobility Impaired (Adult and Pediatric)  Goal: *Acute Goals and Plan of Care (Insert Text)  Physical Therapy Goals  Initiated 3/5/2017 and to be accomplished within 5 day(s)  1. Patient will move from supine <> sit with S in prep for out of bed activity and change of position. 2. Patient will perform sit<> stand with S with rolling walker in prep for transfers/ambulation. 3. Patient will transfer from bed <> chair with CGA with rolling walker for time up in chair for completion of ADL activity. 4. Patient will ambulate 100 feet with rolling walker with CGA for increase functional mobility. 5. Patient will ascend/descend 8 steps with handrail(s) with minimal assistance/contact guard assist for home re-entry as needed. Outcome: Progressing Towards Goal  PHYSICAL THERAPY TREATMENT     Patient: Mami Garland (80 y.o. female)  Date: 3/7/2017  Diagnosis: Altered mental status  SIRS (systemic inflammatory response syndrome) (HCC)  Aspiration pneumonia (HCC)  Urinary retention <principal problem not specified>       Precautions: Fall   Chart, physical therapy assessment, plan of care and goals were reviewed. ASSESSMENT:  Patient is demonstrating improved ambulatory mobility this session. Patient was assisted out of bed with minimal assistance and then stood up to a rolling walker. Patient proceeded to ambulate for about 35 feet with CGA. Gait is slow but with increased stability compared to yesterday. Patient tolerated ambulation well, however O2 saturation dropped to 88% while on 1L O2. Patient was seated in a chair at close of session. Will continue PT to maximize safe and independent mobility. Recommend rehab at discharge.   Progression toward goals:  [X]      Improving appropriately and progressing toward goals  [ ]      Improving slowly and progressing toward goals  [ ]      Not making progress toward goals and plan of care will be adjusted       PLAN:  Patient continues to benefit from skilled intervention to address the above impairments. Continue treatment per established plan of care. Discharge Recommendations:  Rehab  Further Equipment Recommendations for Discharge:  N/A       SUBJECTIVE:   Patient stated I'm doing okay.       OBJECTIVE DATA SUMMARY:   Critical Behavior:  Neurologic State: Alert  Orientation Level: Oriented X4  Cognition: Follows commands  Safety/Judgement: Fall prevention, Decreased insight into deficits  Functional Mobility Training:  Bed Mobility:  Rolling: Contact guard assistance  Supine to Sit: Minimum assistance; Moderate assistance  Sit to Supine: Minimum assistance  Transfers:  Sit to Stand: Minimum assistance  Stand to Sit: Minimum assistance  Balance:  Sitting: Intact  Standing: Impaired; With support  Standing - Static: Good  Standing - Dynamic : Fair  Ambulation/Gait Training:  Distance (ft): 35 Feet (ft)  Assistive Device: Gait belt;Walker, rolling  Ambulation - Level of Assistance: Contact guard assistance  Gait Abnormalities: Decreased step clearance  Base of Support: Narrowed  Speed/Bethany: Slow  Step Length: Right shortened;Left shortened  Swing Pattern: Right asymmetrical;Left asymmetrical  Interventions: Verbal cues     Pain:  Pain Scale 1: Numeric (0 - 10)  Pain Intensity 1: 0  Activity Tolerance:   Fair  Please refer to the flowsheet for vital signs taken during this treatment.   After treatment:   [X] Patient left in no apparent distress sitting up in chair  [ ] Patient left in no apparent distress in bed  [X] Call bell left within reach  [X] Nursing notified  [ ] Caregiver present  [ ] Bed alarm activated      To Fernández   Time Calculation: 25 mins

## 2017-03-07 NOTE — PROGRESS NOTES
Patient discharged, will be going to [facility] [address] [place] [address] [phone] patient will need medical transport for safety ,please send avs,d/c summary,medication hard scripts with patient,Rn please call report prior to pt leaving. Care Management Interventions  PCP Verified by CM: Yes  Palliative Care Consult (Criteria: CHF and RRAT>21): No  Reason for No Palliative Care Consult:  Other (see comment)  Mode of Transport at Discharge: BLS  Transition of Care Consult (CM Consult): SNF  Discharge Durable Medical Equipment: No  Health Maintenance Reviewed: Yes  Physical Therapy Consult: Yes  Occupational Therapy Consult: Yes  Speech Therapy Consult: No  Current Support Network: Nursing Facility  Confirm Follow Up Transport: Self  Plan discussed with Pt/Family/Caregiver: Yes  Freedom of Choice Offered: Yes  Discharge Location  Discharge Placement: Skilled nursing facility

## 2017-03-07 NOTE — PROGRESS NOTES
[id number] Shift Summary: Pt rested well overnight with no complaints. No new clinical concerns noted.

## 2017-03-07 NOTE — ROUTINE PROCESS
Bedside and Verbal shift change report given to [address] [place] [address], [phone] (oncoming nurse) by Felecia Urias (offgoing nurse). Report given with SBAR, Kardex, Intake/Output, MAR and Recent Results.

## 2017-03-16 PROBLEM — J96.01 ACUTE RESPIRATORY FAILURE WITH HYPOXIA (HCC): Status: ACTIVE | Noted: 2017-01-01

## 2017-03-16 PROBLEM — J18.9 HCAP (HEALTHCARE-ASSOCIATED PNEUMONIA): Status: ACTIVE | Noted: 2017-01-01

## 2017-03-16 NOTE — ED NOTES
Pt lying on stretcher, reports no pain at this time, no distress noted, Pt remains on monitor and call bell within reach, denies needing bathroom,  room safety check completed, informed of status, awaiting admission, will continue to monitor.

## 2017-03-16 NOTE — ED TRIAGE NOTES
Pt dx with flu and pneumonia, EMS reports pt sob and on breathing treatments when they arrived [facility]  Pt was put on Levaquin yest for pneumonia, also reports possible c. Diff  Sepsis Screening completed    (  )Patient meets SIRS criteria. (x  )Patient does not meet SIRS criteria.       SIRS Criteria is achieved when two or more of the following are present   Temperature < 96.8°F (36°C) or > 100.9°F (38.3°C)   Heart Rate > 90 beats per minute   Respiratory Rate > 20 breaths per minute   WBC count > 12,000 or <4,000 or > 10% bands

## 2017-03-16 NOTE — H&P
History & Physical    Patient: Maebelle Aschoff MRN: [id number]  CSN: [id number]    YOB: 1945  Age: 67 y.o. Sex: female      DOA: 3/16/2017  Primary Care Provider:  Ausitn Tran MD      Assessment/Plan     Patient Active Problem List   Diagnosis Code    Chronic diarrhea K52.9    Cirrhosis of liver ([facility] [address] K74.60    Type II diabetes mellitus ([facility] [address] E11.9    Pancreatic mass K86.9    HTN (hypertension) I10    Pancreatic insufficiency K86.89    UTI (urinary tract infection) N39.0    Sepsis (HCC) A41.9    Acute metabolic encephalopathy Q71.41    CKD (chronic kidney disease) stage 3, GFR 30-59 ml/min N18.3    Thrombocytopenia (HCC) D69.6    History of stroke Z86.73    Dehydration E86.0    Urinary retention R33.9    Aspiration pneumonia ([facility]) J69.0    Altered mental status R41.82    SIRS (systemic inflammatory response syndrome) ([facility]) R65.10    HCAP (healthcare-associated pneumonia) J18.9    Acute respiratory failure with hypoxia ([facility]) J96.01         Admit to ICU or tele   1.acute respiratory failure with hypoxia   2. HCAP  3. DM Tyep II  4 sepsis (poa)  5. Diarrhea   6. Pancreatic insufficiency   7 elevated cr   8 HTN  9 hx of stroke   10 Flu   Critical care plan  -Respiratory : will try to wean off non rebreathing mask, will put bipap if needed   Breathing tx and abx ,steroid   -cardiology: continue bp monitor and po meds , monitor closely   -ID: sepsis bundles, on vanc/levaquin/aztrem   -renal: mild elevated , will hydration cautious   -neuro: alert , will continue monitor   -endo: lantus, ssi, glucose might will be higher due to steroid , will continue to monitor   -GI ppi and npo for now, will test for c diff      AC:  I have had a discussion with patient and family regarding code status.  Particularly, described potential options in event of cardiac or respiratory arrest. I have explained what being full code entails, including cardiorespiratory resuscitation attempts with chest compressions, potential cariodioversion/ \" shocks\" as well as intubation. I have also explained Do not resuscitate which would mean we allow a natural death with out aggressive interventions. DNR/I granted  AC 32 min     70 minutes of critical care time spent in the direct evaluation and treatment of this high risk patient. The reason for providing this level of medical care for this critically ill patient was due a critical illness that impaired one or more vital organ systems such that there was a high probability of imminent or life threatening deterioration in the patients condition. This care involved high complexity decision making to assess, manipulate, and support vital system functions, to treat this degreee vital organ system failure and to prevent further life threatening deterioration of the patients condition. DVT : lovenox. ppi proph  CC: can not breath        HPI:     Florence Hdez is a 67 y.o. female who hx of HTN, DM type II,cirrhosis, copd, was sent to ED due to sob. She was d/c to rehab recently. She had fever and chest pain with sob in rehab and she was tested positive flu and treated with tamiflu one dose last night, also treated as  PNA with levaquin. Diarrhea for one day. She was found desat per EMS and was put on non rebreathing mask. Cxr: bilateral infiltrate vs edema, bnp was 3300. Wbc 15, lactic acid 2.6, cr 1.49   abg : hypoxia. Received fluid in ED.      Visit Vitals    /49    Pulse 72    Temp 98.4 °F (36.9 °C)    Resp 27    Ht 5' 5\" (1.651 m)    Wt 95.3 kg (210 lb)    SpO2 95%    BMI 34.95 kg/m2      O2 Device: Non-rebreather mask      Past Medical History:   Diagnosis Date    Arrhythmia     pt reports a heart murmur    Chronic obstructive pulmonary disease (HCC)     hx of pneumonia    Diabetes ([facility] [address] 2003    Diarrhea     Hypertension     takes B/P meds to protect kidneys    Liver disease     testing for cirrhosis    Osteoarthritis     Pneumonia 2010 Past Surgical History:   Procedure Laterality Date    BREAST SURGERY PROCEDURE UNLISTED Bilateral     fibroid cysts    HX CHOLECYSTECTOMY      HX HYSTERECTOMY      HX MOHS PROCEDURES      HX ORTHOPAEDIC         History reviewed. No pertinent family history. Social History     Social History    Marital status:      Spouse name: N/A    Number of children: N/A    Years of education: N/A     Social History Main Topics    Smoking status: Never Smoker    Smokeless tobacco: Never Used    Alcohol use No    Drug use: No    Sexual activity: No     Other Topics Concern    None     Social History Narrative       Prior to Admission medications    Medication Sig Start Date End Date Taking? Authorizing Provider   isosorbide mononitrate ER (IMDUR) 30 mg tablet Take 1 Tab by mouth daily. 3/7/17   Federica Gonzalez MD   doxycycline (MONODOX) 100 mg capsule Take 1 Cap by mouth two (2) times a day. 3/7/17   Federica Gonzalez MD   nitrofurantoin (MACRODANTIN) 100 mg capsule Take 1 Cap by mouth nightly. Indications: BACTERIAL URINARY TRACT INFECTION 3/7/17   Federica Gonzalez MD   potassium chloride SR (K-TAB) 20 mEq tablet Take 1 Tab by mouth daily. 3/7/17   Federica Gonzalez MD   carvedilol (COREG) 12.5 mg tablet Take 12.5 mg by mouth two (2) times a day. Indications: hypertension    Chrissy Chavira MD   glucosamine-chondroitin (OSTEO BI-FLEX) 250-200 mg tab Take 250 mg by mouth daily. Indications: joint care    Alexis Lemon MD   losartan (COZAAR) 50 mg tablet Take 50 mg by mouth daily. Indications: hypertension    Chrissy Chavira MD   calcitRIOL (ROCALTROL) 0.25 mcg capsule Take 0.25 mcg by mouth daily. Indications: hypocalcemia    Historical Provider   CALCIUM CARBONATE (CALCIUM 600 PO) Take 600 mg by mouth daily. Indications: low calcium    Chrissy Chavira MD   b complex vitamins tablet Take 1 Tab by mouth daily.     Historical Provider   insulin aspart (NOVOLOG FLEXPEN) 100 unit/mL inpn 15 Units by SubCUTAneous route Before breakfast, lunch, and dinner. Historical Provider   folic acid 915 mcg tablet Take 400 mcg by mouth daily. Bharti Kc MD   insulin glargine (LANTUS SOLOSTAR) 100 unit/mL (3 mL) pen 40 Units by SubCUTAneous route every morning. Bharti Kc MD   insulin glargine (LANTUS SOLOSTAR) 100 unit/mL (3 mL) pen 15 Units by SubCUTAneous route Daily (before dinner). Bharti Kc MD   lactulose 10 gram/15 mL (15 mL) soln Take 15 mL by mouth daily. 16   Uriel Wall MD   celecoxib (CELEBREX) 100 mg capsule Take 100 mg by mouth two (2) times a day. Historical Provider   escitalopram oxalate (LEXAPRO) 10 mg tablet Take 10 mg by mouth daily. Historical Provider   lipase-protease-amylase (ZENPEP) capsule Take 2 Caps by mouth three (3) times daily (with meals). Strength 10,000 units capsule  Indications: bowel leakage    Historical Provider       Allergies   Allergen Reactions    Darvon [Propoxyphene] Shortness of Breath    Pcn [Penicillins] Swelling    Tylenol [Acetaminophen] Other (comments)     Confusion       Review of Systems  Unable to obtain due to on non rebreathing mask         Physical Exam:     Physical Exam:  Visit Vitals    /49    Pulse 72    Temp 98.4 °F (36.9 °C)    Resp 27    Ht 5' 5\" (1.651 m)    Wt 95.3 kg (210 lb)    SpO2 95%    BMI 34.95 kg/m2      O2 Device: Non-rebreather mask    Temp (24hrs), Av.4 °F (36.9 °C), Min:98.4 °F (36.9 °C), Max:98.4 °F (36.9 °C)             General:  Awake, cooperative, in mild  distress. Head:  Normocephalic, without obvious abnormality, atraumatic. Eyes:  Conjunctivae/corneas clear, sclera anicteric, PERRL, EOMs intact. Nose: Nares normal. No drainage or sinus tenderness. Throat: Lips, mucosa, and tongue normal. .   Neck: Supple, symmetrical, trachea midline, no adenopathy. Lungs:   Rales        Heart:  Regular rate and rhythm, S1, S2 normal, + murmur, click, rub or gallop.      Abdomen: Soft, non-tender. Bowel sounds normal. No masses,  No organomegaly. Extremities: Extremities normal, atraumatic, no cyanosis or edema. Pulses: 2+ and symmetric all extremities. Skin: Skin color-pink, texture, turgor normal. No rashes or lesions. Capillary refill normal    Neurologic: CNII-XII intact. No focal motor or sensory deficit.        Labs Reviewed:    BMP:   Lab Results   Component Value Date/Time     03/16/2017 10:06 AM    K 4.1 03/16/2017 10:06 AM     03/16/2017 10:06 AM    CO2 26 03/16/2017 10:06 AM    AGAP 15 03/16/2017 10:06 AM     (H) 03/16/2017 10:06 AM    BUN 57 (H) 03/16/2017 10:06 AM    CREA 1.49 (H) 03/16/2017 10:06 AM    GFRAA 42 (L) 03/16/2017 10:06 AM    GFRNA 34 (L) 03/16/2017 10:06 AM     CMP:   Lab Results   Component Value Date/Time     03/16/2017 10:06 AM    K 4.1 03/16/2017 10:06 AM     03/16/2017 10:06 AM    CO2 26 03/16/2017 10:06 AM    AGAP 15 03/16/2017 10:06 AM     (H) 03/16/2017 10:06 AM    BUN 57 (H) 03/16/2017 10:06 AM    CREA 1.49 (H) 03/16/2017 10:06 AM    GFRAA 42 (L) 03/16/2017 10:06 AM    GFRNA 34 (L) 03/16/2017 10:06 AM    CA 9.6 03/16/2017 10:06 AM    ALB 2.6 (L) 03/16/2017 10:06 AM    TP 7.2 03/16/2017 10:06 AM    GLOB 4.6 (H) 03/16/2017 10:06 AM    AGRAT 0.6 (L) 03/16/2017 10:06 AM    SGOT 30 03/16/2017 10:06 AM    ALT 25 03/16/2017 10:06 AM     CBC:   Lab Results   Component Value Date/Time    WBC 15.2 (H) 03/16/2017 10:06 AM    HGB 12.3 03/16/2017 10:06 AM    HCT 37.5 03/16/2017 10:06 AM     03/16/2017 10:06 AM     All Cardiac Markers in the last 24 hours:   Lab Results   Component Value Date/Time    CPK 9 (L) 03/16/2017 10:06 AM    CKMB <1.0 03/16/2017 10:06 AM    CKND1 Cannot be calulated 03/16/2017 10:06 AM    TROIQ <0.02 03/16/2017 10:06 AM     Recent Glucose Results:   Lab Results   Component Value Date/Time     (H) 03/16/2017 10:06 AM     ABG:   Lab Results   Component Value Date/Time    PHI 7.394 03/16/2017 10:50 AM    PCO2I 37.7 03/16/2017 10:50 AM    PO2I 65 (L) 03/16/2017 10:50 AM    HCO3I 23.0 03/16/2017 10:50 AM    FIO2I 10 03/16/2017 10:50 AM     COAGS: No results found for: APTT, PTP, INR  Liver Panel:   Lab Results   Component Value Date/Time    ALB 2.6 (L) 03/16/2017 10:06 AM    TP 7.2 03/16/2017 10:06 AM    GLOB 4.6 (H) 03/16/2017 10:06 AM    AGRAT 0.6 (L) 03/16/2017 10:06 AM    SGOT 30 03/16/2017 10:06 AM    ALT 25 03/16/2017 10:06 AM     (H) 03/16/2017 10:06 AM     Pancreatic Markers: No results found for: AMYLPOCT, AML, LIPPOCT, LPSE    Procedures/imaging: see electronic medical records for all procedures/Xrays and details which were not copied into this note but were reviewed prior to creation of Katherine Gay MD, Internal Medicine     CC: Nya Delacruz MD

## 2017-03-16 NOTE — PROGRESS NOTES
Attempted to wean from NRB to 50% Venti mask but not tolerated, O2 sats were in mid to high 80's, replaced NRB    1135  Able to wean to 50% Venti Mask at this time. Will monitor. 1440  Reassessed pt, she is sleeping, looks fairly comfortable, RR 27, O2sat 94-97% on 50% Venti Mask, upon awakening pt says her breathing feels more comfortable, BS rales not as prominent.

## 2017-03-16 NOTE — ED PROVIDER NOTES
[address]  EMERGENCY DEPARTMENT HISTORY AND PHYSICAL EXAM       Date: 3/16/2017   Patient Name: Keith Plata   YOB: 1945  Medical Record Number: [id number]    History of Presenting Illness     Chief Complaint   Patient presents with    Shortness of Breath        History Provided By:  patient    Additional History:   9:45 AM  Keith Plata is a 67 y.o. female with PNA who presents to the emergency department via EMS from nursing home C/O SOB. Pt reports left-sided chest pain, diarrhea X2 days, fever. Pt was sent here from her nursing home and was diagnosed 2 days ago with PNA and the flu. Started on Levaquin. Medics state she was around 80% oxygen saturation upon their arrival and put her on a NRB which improved it to 98%. There was some concern of C-diff due to diarrhea. +recent hospitalization at [facility] ED approx 2 weeks ago for sepsis from UTI and possible PNA. Pt denies dysuria and any other Sx or complaints at this time. Primary Care Provider: Milly Paz MD   Specialist:    Past History     Past Medical History:   Past Medical History:   Diagnosis Date    Arrhythmia     pt reports a heart murmur    Chronic obstructive pulmonary disease ([address]     hx of pneumonia    Diabetes ([facility] [address] 2003    Diarrhea     Hypertension     takes B/P meds to protect kidneys    Liver disease     testing for cirrhosis    Osteoarthritis     Pneumonia 2010        Past Surgical History:   Past Surgical History:   Procedure Laterality Date    BREAST SURGERY PROCEDURE UNLISTED Bilateral     fibroid cysts    HX CHOLECYSTECTOMY      HX HYSTERECTOMY      HX MOHS PROCEDURES      HX ORTHOPAEDIC          Family History:   History reviewed. No pertinent family history. Social History:   Social History   Substance Use Topics    Smoking status: Never Smoker    Smokeless tobacco: Never Used    Alcohol use No        Allergies:    Allergies   Allergen Reactions    Darvon [Propoxyphene] Shortness of Breath    Pcn [Penicillins] Swelling    Tylenol [Acetaminophen] Other (comments)     Confusion        Review of Systems   Review of Systems   Constitutional: Positive for fever. Eyes: Positive for redness. Respiratory: Positive for shortness of breath. Cardiovascular: Positive for chest pain (Left sided ). Gastrointestinal: Positive for abdominal pain (Positional) and diarrhea. Genitourinary: Negative for dysuria. All other systems reviewed and are negative. Physical Exam  Vitals:    03/16/17 1330 03/16/17 1415 03/16/17 1500 03/16/17 1545   BP: (!) 127/38 114/64 126/42 126/41   Pulse: 70 72 71 72   Resp: 18 28 22 27   Temp:       SpO2: 90% 93% 93% 98%   Weight:       Height:           Physical Exam   Nursing note and vitals reviewed. Vital signs and nursing notes reviewed. CONSTITUTIONAL: Alert. Chroncially ill-appearing elderly female, in mild respiratory distress on nonrebreather mask. HEAD: Normocephalic; atraumatic. EYES: PERRL; Conjunctiva clear. ENT: Dry mucus membranes. NECK: Supple; FROM without difficulty, non-tender; no cervical lymphadenopathy. CV: Normal S1, S2; no murmurs, rubs, or gallops. No chest wall tenderness. RESPIRATORY: Mild respiratory distress. Diminished breath sounds bilaterally with some scattered expiratory wheezes and crackles at bases. GI: Normal bowel sounds; non-distended; non-tender; no guarding or rigidity; no palpable organomegaly. No CVA tenderness. BACK:  No evidence of trauma or deformity. Non-tender to palpation. EXT: Normal ROM in all four extremities; non-tender to palpation. SKIN: Normal for age and race; warm; dry; good turgor; no apparent lesions or exudate. NEURO: A & O x3. Moves all extremities. PSYCH:  Mood and affect appropriate.        Diagnostic Study Results     Labs -      Recent Results (from the past 12 hour(s))   LACTIC ACID, PLASMA    Collection Time: 03/16/17 10:06 AM   Result Value Ref Range    Lactic acid 2.6 (HH) 0.4 - 2.0 MMOL/L   METABOLIC PANEL, COMPREHENSIVE    Collection Time: 03/16/17 10:06 AM   Result Value Ref Range    Sodium 145 136 - 145 mmol/L    Potassium 4.1 3.5 - 5.5 mmol/L    Chloride 104 100 - 108 mmol/L    CO2 26 21 - 32 mmol/L    Anion gap 15 3.0 - 18 mmol/L    Glucose 262 (H) 74 - 99 mg/dL    BUN 57 (H) 7.0 - 18 MG/DL    Creatinine 1.49 (H) 0.6 - 1.3 MG/DL    BUN/Creatinine ratio 38 (H) 12 - 20      GFR est AA 42 (L) >60 ml/min/1.73m2    GFR est non-AA 34 (L) >60 ml/min/1.73m2    Calcium 9.6 8.5 - 10.1 MG/DL    Bilirubin, total 1.7 (H) 0.2 - 1.0 MG/DL    ALT (SGPT) 25 13 - 56 U/L    AST (SGOT) 30 15 - 37 U/L    Alk. phosphatase 204 (H) 45 - 117 U/L    Protein, total 7.2 6.4 - 8.2 g/dL    Albumin 2.6 (L) 3.4 - 5.0 g/dL    Globulin 4.6 (H) 2.0 - 4.0 g/dL    A-G Ratio 0.6 (L) 0.8 - 1.7     CBC WITH AUTOMATED DIFF    Collection Time: 03/16/17 10:06 AM   Result Value Ref Range    WBC 15.2 (H) 4.6 - 13.2 K/uL    RBC 4.22 4.20 - 5.30 M/uL    HGB 12.3 12.0 - 16.0 g/dL    HCT 37.5 35.0 - 45.0 %    MCV 88.9 74.0 - 97.0 FL    MCH 29.1 24.0 - 34.0 PG    MCHC 32.8 31.0 - 37.0 g/dL    RDW 15.0 (H) 11.6 - 14.5 %    PLATELET 115 050 - 672 K/uL    MPV 11.4 9.2 - 11.8 FL    NEUTROPHILS 87 (H) 40 - 73 %    LYMPHOCYTES 6 (L) 21 - 52 %    MONOCYTES 5 3 - 10 %    EOSINOPHILS 2 0 - 5 %    BASOPHILS 0 0 - 2 %    ABS. NEUTROPHILS 13.3 (H) 1.8 - 8.0 K/UL    ABS. LYMPHOCYTES 1.0 0.9 - 3.6 K/UL    ABS. MONOCYTES 0.7 0.05 - 1.2 K/UL    ABS. EOSINOPHILS 0.2 0.0 - 0.4 K/UL    ABS.  BASOPHILS 0.0 0.0 - 0.06 K/UL    DF AUTOMATED     CARDIAC PANEL,(CK, CKMB & TROPONIN)    Collection Time: 03/16/17 10:06 AM   Result Value Ref Range    CK 9 (L) 26 - 192 U/L    CK - MB <1.0 <3.6 ng/ml    CK-MB Index Cannot be calulated 0.0 - 4.0 %    Troponin-I, Qt. <0.02 0.00 - 0.06 NG/ML   PRO-BNP    Collection Time: 03/16/17 10:06 AM   Result Value Ref Range    NT pro-BNP 3394 (H) 0 - 900 PG/ML   HEMOGLOBIN A1C WITH EAG Collection Time: 03/16/17 10:06 AM   Result Value Ref Range    Hemoglobin A1c 9.0 (H) 4.5 - 5.6 %    Est. average glucose 212 mg/dL   POC G3    Collection Time: 03/16/17 10:50 AM   Result Value Ref Range    Device: Non rebreather      Flow rate (POC) 15 L/M    FIO2 (POC) 10 %    pH (POC) 7.394 7.35 - 7.45      pCO2 (POC) 37.7 35.0 - 45.0 MMHG    pO2 (POC) 65 (L) 80 - 100 MMHG    HCO3 (POC) 23.0 22 - 26 MMOL/L    sO2 (POC) 92 92 - 97 %    Base deficit (POC) 2 mmol/L    Allens test (POC) YES      Total resp.  rate 30      Site RIGHT RADIAL      Specimen type (POC) ARTERIAL      Performed by Turn (RAPID TEST)    Collection Time: 03/16/17 11:14 AM   Result Value Ref Range    Influenza A Antigen NEGATIVE  NEG      Influenza B Antigen NEGATIVE  NEG     EKG, 12 LEAD, INITIAL    Collection Time: 03/16/17 11:18 AM   Result Value Ref Range    Ventricular Rate 65 BPM    Atrial Rate 65 BPM    P-R Interval 142 ms    QRS Duration 94 ms    Q-T Interval 422 ms    QTC Calculation (Bezet) 438 ms    Calculated P Axis 31 degrees    Calculated R Axis -17 degrees    Calculated T Axis 51 degrees    Diagnosis       Normal sinus rhythm  Voltage criteria for left ventricular hypertrophy  Abnormal ECG  When compared with ECG of 05-MAR-2017 12:03,  QT has shortened     URINALYSIS W/ RFLX MICROSCOPIC    Collection Time: 03/16/17 12:39 PM   Result Value Ref Range    Color DARK YELLOW      Appearance CLOUDY      Specific gravity 1.019 1.005 - 1.030      pH (UA) 5.0 5.0 - 8.0      Protein NEGATIVE  NEG mg/dL    Glucose NEGATIVE  NEG mg/dL    Ketone NEGATIVE  NEG mg/dL    Bilirubin NEGATIVE  NEG      Blood NEGATIVE  NEG      Urobilinogen 0.2 0.2 - 1.0 EU/dL    Nitrites NEGATIVE  NEG      Leukocyte Esterase MODERATE (A) NEG     URINE MICROSCOPIC ONLY    Collection Time: 03/16/17 12:39 PM   Result Value Ref Range    WBC 4 to 10 0 - 5 /hpf    RBC 0 to 3 0 - 5 /hpf    Epithelial cells FEW 0 - 5 /lpf    Bacteria FEW (A) NEG /hpf    Hyaline cast 0 to 3 0 - 2 /lpf    Budding Yeast 2 (A) NEG     GLUCOSE, POC    Collection Time: 03/16/17  2:07 PM   Result Value Ref Range    Glucose (POC) 305 (H) 70 - 110 mg/dL   LACTIC ACID, PLASMA    Collection Time: 03/16/17  2:15 PM   Result Value Ref Range    Lactic acid 1.7 0.4 - 2.0 MMOL/L       Radiologic Studies -  XR CHEST PORT   Final Result:  1. New diffuse bilateral alveolar infiltrates. Differential includes pulmonary  edema versus diffuse bilateral pneumonia. Medical Decision Making   I am the first provider for this patient. I reviewed the vital signs, available nursing notes, past medical history, past surgical history, family history and social history. Vital Signs-Reviewed the patient's vital signs. Patient Vitals for the past 12 hrs:   Temp Pulse Resp BP SpO2   03/16/17 1545 - 72 27 126/41 98 %   03/16/17 1500 - 71 22 126/42 93 %   03/16/17 1415 - 72 28 114/64 93 %   03/16/17 1330 - 70 18 (!) 127/38 90 %   03/16/17 1230 - 72 27 - 95 %   03/16/17 1215 - 68 18 - 93 %   03/16/17 1200 - 69 20 139/49 90 %   03/16/17 1145 - 68 26 140/41 98 %   03/16/17 1132 - - - - 97 %   03/16/17 1130 - 68 24 139/47 90 %   03/16/17 1115 - 66 (!) 33 130/42 96 %   03/16/17 1100 - 64 21 136/45 100 %   03/16/17 1045 - 65 22 136/45 98 %   03/16/17 1030 - 67 (!) 39 (!) 124/92 (!) 81 %   03/16/17 1015 - 64 24 144/49 98 %   03/16/17 1013 - - - - 97 %   03/16/17 1000 - 67 21 147/54 -   03/16/17 0955 98.4 °F (36.9 °C) 72 26 131/44 96 %     EKG interpretation: (Preliminary)  11:18 AM   NSR. Rate 65 bpm. Voltage criteria for left ventricular hypertrophy. No stemi. EKG read by Elena Michael PA-C     Pulse Oximetry Analysis - Normal 96% on NRB     Cardiac Monitor:   Rate: 66 bpm   Rhythm: Normal Sinus Rhythm     ED Course:    9:45 AM  Initial assessment performed.     CONSULT NOTE:   11:08 AM  Elena Michael PA-C spoke with Gopi Crain MD   Specialty: ED Physician   Discussed pt's hx, disposition, and available diagnostic and imaging results over the telephone. Reviewed care plans. Consulting physician agrees with plans as outlined. He discussed with family, patient has DNR. Recommends giving another Neb, 500 CCs IV fluids, no Lasix at this time. Titrate oxygen down to determine further therapy If sats drop, consider bi-pap. Written by AISHA Doe, as dictated by [employer]. FACE-TO-FACE PROGRESS NOTE:  11:11 AM  Was requested to see pt by the RENÉ. Evaluated pt face-to-face. Called to see pt with bilateral PNA, hypoxia on room air, and put on 100% O2 now in mid 90s O2 sat. Dxd with PNA and pulmonary edema. Pt in mild respiratory distress. A&Ox3, diffuse wheezes bilaterally with crackles at the bases. Heart is RRR, no MRG. Abd is soft non tender, non distended. Pt with no edema, mouth very dry. Discussed with MLP to admit pt. Discussed blood gas, IV abx, IV fluid hydration with possiblility of watching for worsening pulmonary edema. Will do SIRS pathway, pt not hypotensive. Written by AISHA Ortegae, as dictated by Bob Zazueta MD.     CONSULT NOTE:   11:15 AM  [employer] Blum spoke with Zoya Posey MD   Specialty: Internal medicine  Discussed pt's hx, disposition, and available diagnostic and imaging results in the ED. Reviewed care plans. Consulting physician agrees with plans as outlined. Will admit to Telemetry. Agrees with another Neb, continue NRB. Decrease O2 to determine need for bi-pap or not. She will see pt. Written by AISHA Doe, as dictated by [employer]. PROGRESS NOTE:   11:25 AM  Pt has been examined by Zoya Posey MD in ED. Recommends keeping patient on NRB, does not require bipap and admit to Telemetry.   Written by AISHA Doe, as dictated by [employer].    11:27 AM   Patient is being admitted to the hospital by Zoya Posey MD. The results of their tests and reasons for their admission have been discussed with them and/or available family. They convey agreement and understanding for the need to be admitted and for their admission diagnosis. CONDITIONS ON ADMISSION:  Deep Vein Thrombosis is not present at the time of admission. Thrombosis is not present at the time of admission. Urinary Tract Infection is unknown at the time of admission(sample not yet given). Pneumonia is present at the time of admission. MRSA is not present at the time of admission. Wound infection is not present at the time of admission. Pressure Ulcer is not present at the time of admission.       Medications Given in the ED:  Medications   sodium chloride (NS) flush 5-10 mL (not administered)   aztreonam (AZACTAM) 2 g in 0.9% sodium chloride (MBP/ADV) 100 mL MBP (0 g IntraVENous IV Completed 3/16/17 1300)   levoFLOXacin (LEVAQUIN) 750 mg in D5W IVPB (0 mg IntraVENous IV Completed 3/16/17 1233)   ondansetron (ZOFRAN) injection 4 mg (4 mg IntraVENous Given 3/16/17 1205)   oseltamivir (TAMIFLU) capsule 75 mg (75 mg Oral Given 3/16/17 1223)   budesonide (PULMICORT) 500 mcg/2 ml nebulizer suspension (not administered)   arformoterol (BROVANA) neb solution 15 mcg (not administered)   isosorbide mononitrate ER (IMDUR) tablet 30 mg (not administered)   carvedilol (COREG) tablet 12.5 mg (not administered)   losartan (COZAAR) tablet 50 mg (not administered)   vitamin B complex tablet (not administered)   folic acid tablet 0.4 mg (not administered)   lipase-protease-amylase (ZENPEP 10,000) capsule 2 Cap (0 Caps Oral Held 3/16/17 1255)   insulin lispro (HUMALOG) injection (8 Units SubCUTAneous Given 3/16/17 1407)   glucose chewable tablet 16 g (not administered)   glucagon (GLUCAGEN) injection 1 mg (not administered)   dextrose (D50W) injection syrg 12.5-25 g (not administered)   methylPREDNISolone (PF) (SOLU-MEDROL) injection 40 mg (40 mg IntraVENous Given 3/16/17 1404)   enoxaparin (LOVENOX) injection 30 mg (30 mg SubCUTAneous Given 3/16/17 1402)   pantoprazole (PROTONIX) 40 mg in sodium chloride 0.9 % 10 mL injection (40 mg IntraVENous Given 3/16/17 1409)   insulin glargine (LANTUS) injection 40 Units (not administered)   vancomycin (VANCOCIN) 1,500 mg in 0.9% sodium chloride 500 mL IVPB (not administered)   Vancomycin - Pharmacokinetic Dosing (not administered)   methylPREDNISolone (PF) (SOLU-MEDROL) injection 125 mg (125 mg IntraVENous Given 3/16/17 1110)   albuterol-ipratropium (DUO-NEB) 2.5 MG-0.5 MG/3 ML (3 mL Nebulization Given 3/16/17 1018)   albuterol-ipratropium (DUO-NEB) 2.5 MG-0.5 MG/3 ML (3 mL Nebulization Given 3/16/17 1013)   vancomycin (VANCOCIN) 1,500 mg in 0.9% sodium chloride 500 mL IVPB (0 mg IntraVENous IV Completed 3/16/17 1513)   magnesium sulfate 2 g/50 ml IVPB (premix or compounded) (0 g IntraVENous IV Completed 3/16/17 1335)   albuterol (PROVENTIL VENTOLIN) nebulizer solution 2.5 mg (2.5 mg Nebulization Given 3/16/17 1128)   sodium chloride 0.9 % bolus infusion 2,859 mL (0 mL IntraVENous IV Completed 3/16/17 1345)         Critical Care Time:   11:26 AM  I have spent 60 minutes of critical care time involved in lab review, consultations with specialist, family decision-making, and documentation. During this entire length of time I was immediately available to the patient. Critical Care: The reason for providing this level of medical care for this critically ill patient was due a critical illness that impaired one or more vital organ systems such that there was a high probability of imminent or life threatening deterioration in the patients condition. This care involved high complexity decision making to assess, manipulate, and support vital system functions, to treat this degreee vital organ system failure and to prevent further life threatening deterioration of the patients condition. Diagnosis   Clinical Impression:   1. Sepsis, due to unspecified organism ([facility] [address]    2.  HCAP (healthcare-associated pneumonia) 3. Influenza    4. Nausea, vomiting and diarrhea           _______________________________   Attestations: This note is prepared by Brent Blackman, acting as a Scribe for [employer] on 9:45 AM on 3/16/2017 . [employer]: The scribe's documentation has been prepared under my direction and personally reviewed by me in its entirety.   _______________________________

## 2017-03-16 NOTE — ROUTINE PROCESS
TRANSFER - IN REPORT:    Verbal report received from [address] RN(name) on [address]  being received from ER(unit) for routine progression of care      Report consisted of patients Situation, Background, Assessment and   Recommendations(SBAR). Information from the following report(s) SBAR, Kardex and Intake/Output was reviewed with the receiving nurse. Opportunity for questions and clarification was provided. Assessment completed upon patients arrival to unit and care assumed.

## 2017-03-16 NOTE — ROUTINE PROCESS
1800: pt to the room from ED. Pt transferred to bed with assist, bedding changed, pt incont, no skin breakdown. Pt on venti mask at 10 liters, sats 85%, pt sob. Pt placed on nonreabreather and 15 liters. After a few minutes sats back to 95%. Daughter at bedside, nursing admission history completed. Pt on contact isolation to r/o c-diff.     1925: Bedside and Verbal shift change report given to [id number] (oncoming nurse) by Luca Yan RN   (offgoing nurse).  Report included the following information SBAR, Kardex, MAR, Recent Results and Cardiac Rhythm SR.

## 2017-03-16 NOTE — PROGRESS NOTES
Pharmacy - Vancomycin Dosing  Consult provided for this 67y.o. year old ,female for indication of Pneumonia ( HCAP ). Therapy day 1        Wt Readings from Last 1 Encounters:   03/16/17 95.3 kg (210 lb)       Ht Readings from Last 1 Encounters:   03/16/17 165.1 cm (65\")     Dosing Weight:  95.3 kg     Additional Antibiotics:  Aztreonam, Levofloxacin    Date:   3/16/17  Lab Results   Component Value Date/Time    Creatinine 1.49 03/16/2017 10:06 AM     creatinine clearance  38.4 ml/min    white blood cell count  15.2    Patient received Vancomycin 1500 mg IV at 13:19 3/16/17. Will continue Vancomycin 1500 mg IV q24hrs. Trough Level after 3-4 doses infused. Dose calculated to approximate a therapeutic trough of 15-20 mcg/mL. Pharmacy to follow daily and will make changes to dose and/or frequency based on clinical status.       [address] [facility] [place], [address] [place] [address]

## 2017-03-17 NOTE — PROGRESS NOTES
1942:  Assumed care. Pt awake and alert/ Pt family at bedside. Pt with non rebreathe with labored breathing. Pt resting in bed with no acute signs of distress. Call bell and telephone within reach. White board updated. 2320:  Pt placed on high flow. Shift Summary:  Pt had uneventful shift  Pt sats 89% to 91% on high flow. Pt in bed resting with no signs of distress or c/o pain.

## 2017-03-17 NOTE — PROGRESS NOTES
Hospitalist Progress Note-critical care note     Patient: Kelsey Wagner MRN: [id number]  CSN: [id number]    YOB: 1945  Age: 67 y.o. Sex: female    DOA: 3/16/2017 LOS:  LOS: 1 day            Chief complaint: hcap. Flu, acute respiratory failure , DM II     Assessment/Plan         Patient Active Problem List   Diagnosis Code    Chronic diarrhea K52.9    Cirrhosis of liver ([facility] [address] K74.60    Type II diabetes mellitus ([facility] [address] E11.9    Pancreatic mass K86.9    HTN (hypertension) I10    Pancreatic insufficiency K86.89    UTI (urinary tract infection) N39.0    Sepsis (HCC) A41.9    Acute metabolic encephalopathy H07.16    CKD (chronic kidney disease) stage 3, GFR 30-59 ml/min N18.3    Thrombocytopenia (HCC) D69.6    History of stroke Z86.73    Dehydration E86.0    Urinary retention R33.9    Aspiration pneumonia ([facility]) J69.0    Altered mental status R41.82    SIRS (systemic inflammatory response syndrome) ([facility]) R65.10    HCAP (healthcare-associated pneumonia) J18.9    Acute respiratory failure with hypoxia ([facility]) J96.01       1.acute respiratory failure with hypoxia    on high flow O2, still desat while anxiety or moving around. Continue treat hcap ,breathing tx  2. HCAP  On three abx,breathing tx and steroid   3. DM Tyep II  Increase lantus and premeal ,ssi, will start gtt if still out of control   4 sepsis (poa)  Improving, will f/u with the cx   5. Diarrhea   No bm since admission   6. Pancreatic insufficiency   7 elevated cr   Resolved   8 HTN  Continue home meds   9 hx of stroke   10 Flu   tamiflu   11 anxiety   Klonopin prn    Subjective; sob a little bit better   Nurse; desat with anxiety ,glucose elevated      Daughter was at the bedside   All questions have been answered. 35 total min's spent on patient care including >50% on counseling/coordinating care. Discussed the above assessments.  also discussed labs, medications and hospital course    Review of systems:    General: No fevers or chills. Cardiovascular: No chest pain or pressure. No palpitations. Pulmonary: shortness of breath. Gastrointestinal: No nausea, vomiting. Vital signs/Intake and Output:  Visit Vitals    /54 (BP 1 Location: Left arm, BP Patient Position: At rest)    Pulse 76    Temp 97.5 °F (36.4 °C)    Resp 19    Ht 5' 5\" (1.651 m)    Wt 94.8 kg (209 lb)    SpO2 (!) 78%    Breastfeeding No    BMI 34.78 kg/m2     Current Shift:     Last three shifts:  03/15 1901 - 03/17 0700  In: 100 [I.V.:100]  Out: 1600 [Urine:1600]    Physical Exam:  General: WD, WN. Alert, cooperative, no acute distress    HEENT: NC, Atraumatic. PERRLA, anicteric sclerae. Lungs: + Wheezing/Rhonchi/Rales. Heart:  Regular  rhythm,  No murmur, No Rubs, No Gallops  Abdomen: Soft, Non distended, Non tender.  +Bowel sounds,   Extremities: No c/c/e  Psych:    anxious, no  agitated. Neurologic:  No acute neurological deficit. Labs: Results:       Chemistry Recent Labs      03/17/17   0546  03/16/17   1006   GLU  348*  262*   NA  146*  145   K  5.0  4.1   CL  113*  104   CO2  20*  26   BUN  52*  57*   CREA  1.17  1.49*   CA  8.8  9.6   AGAP  13  15   BUCR  44*  38*   AP   --   204*   TP   --   7.2   ALB   --   2.6*   GLOB   --   4.6*   AGRAT   --   0.6*      CBC w/Diff Recent Labs      03/17/17   0546  03/16/17   1006   WBC  9.3  15.2*   RBC  3.72*  4.22   HGB  10.6*  12.3   HCT  33.5*  37.5   PLT  171  202   GRANS  88*  87*   LYMPH  7*  6*   EOS  0  2      Cardiac Enzymes Recent Labs      03/16/17   1006   CPK  9*   CKND1  Cannot be calulated      Coagulation No results for input(s): PTP, INR, APTT in the last 72 hours. No lab exists for component: INREXT    Lipid Panel No results found for: CHOL, CHOLPOCT, CHOLX, CHLST, CHOLV, [id number], HDL, LDL, NLDLCT, DLDL, LDLC, DLDLP, [id number], VLDLC, VLDL, TGL, TGLX, TRIGL, [id number], TRIGP, TGLPOCT, [id number], CHHD, CHHDX   BNP No results for input(s): BNPP in the last 72 hours. Liver Enzymes Recent Labs      03/16/17   1006   TP  7.2   ALB  2.6*   AP  204*   SGOT  30      Thyroid Studies Lab Results   Component Value Date/Time    TSH 1.43 12/06/2016 07:00 AM        Procedures/imaging: see electronic medical records for all procedures/Xrays and details which were not copied into this note but were reviewed prior to creation of Bernardo Rueda MD

## 2017-03-17 NOTE — PROGRESS NOTES
Readmission Risk Assessment:     High Risk and MSSP/[facility] Help ACO patients    RRAT Score:  21 or greater    Initial Assessment:chart reviewed and spoke with patient and daughter at bedside,pt admitted for acute respiratory failure with hypoxia, pt came from [facility] rehab facility and plans to return after [address] completed,no plan for discharge over weekend cm will follow thru on Monday. Emergency Contact:  See chaart    Pertinent Medical Hx:see chart        PCP/Specialists:  Sreekanth Chavira     Community Services:      DME:         High Risk Care Transition Plan:  1. Evaluate for [facility] or [facility], SNF, acute rehab, community care coordination of Resources. 2. Involve patient/caregiver in assessment, planning, education and implement of intervention. 3. CM daily patient care huddles/interdisciplinary rounds. 4. PCP/Specialist appointment within 48 hours of discharge unless otherwise specified. 5. Facilitate transportation and logistics for follow-up appointments. 6. Medication reconciliation [address]  7. Discharge follow-up phone call within 2  4 days (NN, [facility], Nursing) CM follow up as assigned. 8. Formal handoff between hospital provider and post-acute provider to transition patient  9. Handoff to [facility] Nurse Navigator or PCP practice.

## 2017-03-17 NOTE — DIABETES MGMT
NUTRITION ASSESSMENT / GLYCEMIC CONTROL PLAN OF CARE    Assessment/Recommendations:  - BG out of target range this Am, pt with known h//o DM, on TDD of 100 units insulin/day at home (see below for details)  - noted steroids on board and exacerbating hyperglycemia, 300+ today  - recommend modify insulin regimen as follows:   * add mealtime dose of Humalog 15 units qac    - continue corrective coverage   - if BG continues >300-400, recommend q 2 hour POCT & Humalog corrective coverage   - education given (3/6)  - pt with excellent intake, daughter bringing in food      Nutrition Diagnoses:   1: Altered nutrition-related lab values Related to: DM2  Evidenced By: A1C of 9.0% and EAG of 212 mg/dl  2: Overweight/obesity Related to: h/o excessive energy intake  Evidenced By: BMI of 35%  3: Self-monitoring deficit  Related to: DM  Evidenced By: pt report of does not check BG regularly, forgetful at times    Goal:  - BG will be in target range of  mg/dl (non-ICU) or 140-180 md/dl (ICU) by 3/20/17  - PO intake will average at least 75% of meals offered by 3/20/17  - pt will maintain wt by 3/20/17      Most recent blood glucose values:     Lab Results   Component Value Date/Time     (H) 03/17/2017 05:46 AM    GLUCPOC 427 (HH) 03/17/2017 02:07 PM    GLUCPOC 481 (HH) 03/17/2017 12:23 PM    GLUCPOC 308 (H) 03/17/2017 06:06 AM       Current A1C is equivalent to average blood glucose of 212 mg/dl over the past 2-3 months.     Lab Results   Component Value Date/Time    Hemoglobin A1c 9.0 03/16/2017 10:06 AM           Current hospital diabetes medications:   - Lantus 40 units qhs  - Humalog Very Insulin Resistant Corrective Coverage    Previous day's insulin requirements:   < 24 hours    Home diabetes medications:  - Lantus 40 units qam, 15 units q 4pm (dinner)  - Novolog 15 units qac    Estimated Nutrition Needs: 1500 Kcals/day (25 kcal/kg of IBW), Protein (g): 72 g (1.2 g/kg) Fluid (ml): 1500 ml (1 ml/kcal)  Based on: []          Actual BW    [x]          IBW   []            Adjusted BW        Anthropometrics:   IBW : 59.9 kg (132 lb), % IBW (Calculated): 158.33 %     Body mass index is 34.78 kg/(m^2). Last 3 Recorded Weights in this Encounter    03/16/17 0955 03/17/17 0336 03/17/17 1516   Weight: 95.3 kg (210 lb) 94.8 kg (209 lb) 94.8 kg (209 lb)      Ht Readings from Last 1 Encounters:   03/17/17 5' 5\" (1.651 m)       Diet:    Active Orders   Diet    DIET DIABETIC CONSISTENT CARB Regular       Intake:   No data found. Education:  __x__Refer to Diabetes Education Record (pt with recent admission & education last week 3/6/17)            ____Education not indicated at this time          GIANNI Avery, MPH, RD

## 2017-03-17 NOTE — PROGRESS NOTES
Checked on HFNC- equipment hooked up and working properly. 02 sats 79-81% on 50 L and 100%. Discussed with RN.

## 2017-03-17 NOTE — ROUTINE PROCESS
Bedside and Verbal shift change report given to [facility] SHARRON hicks (oncoming nurse) by Jana Kathleen RN (offgoing nurse).  Report included the following information SBAR, Kardex, Intake/Output, MAR, Recent Results and Cardiac Rhythm SR.

## 2017-03-17 NOTE — PROGRESS NOTES
Transitioned patient from a 100% NRB mask to a high-flow nasal cannula to keep patient's SPO2 in the 90s.

## 2017-03-17 NOTE — PROGRESS NOTES

## 2017-03-18 NOTE — PROGRESS NOTES
PATIENT REMOVED FROM BIPAP AS REQUESTED AND PLACED ON HFNC @ 50L/100%. SPO2 86%. PATIENT IS BEING TRANSITIONED TO COMFORT CARE ACCORDING TO NURSING. FAMILY IS AT THE BEDSIDE.

## 2017-03-18 NOTE — PROGRESS NOTES
Discuss SBAR and patient's current status with Dr Michael Cee, patient's O2 sats have been the mid-high 80's intermittently throughout the day, elevated respirations, shallow abdominal breathing, appears anxious, some faint crackles with diminished breath sound auscultated, on Hi-flow and non-rebreather to maintain sats in the high 80's. Lasix 20 mg iv push BID. Orders received for Neb tx every 4 hours prn and have respiratory assess patient for bipap to see if hypoxia will improve. Endorsed to oncoming nurse ABRIL Arellano to continue to monitor, if O2 sats does not improve, recommend to the md to transfer to ICU.

## 2017-03-18 NOTE — PROGRESS NOTES
1920: Assumed patient care, she was resting quietly in bed with no signs of distress noted. Patient was alert and oriented to person, place, time and situation. Respiratory status was stable after patient was placed  on bipap. Vital signs were stable. MEWS score was a two. Patient denied any pain, discomfort, nausea, vomiting, dizziness or anxiety. White board was updated and explained. Bed was locked and in lowest position. Call bell ,water and personal belongings were within reach. Patient had no questions. Comments or concerns after bedside shift report. 0700: Patient had an uneventful night. Family was at the bedside. Respiratory status, vital signs and MEWS score remained stable. Patient had no questions, comments or concerns after bedside shift report.

## 2017-03-18 NOTE — PROGRESS NOTES
met with patient's daughter, completed the initial Spiritual Assessment of the patient, and offered Pastoral Care, see flow sheets for interventions. Patient was resting. Daughter was visiting. Pastoral support provided. Greeting card left at bedside. Patient is [employer].  Patient does not have any Buddhist/cultural needs that will affect patients preferences in health care. Chart reviewed. Chaplains will continue to follow and will provide pastoral care on an as needed/as requested basis. Cuate Jaime MDiv.   Board Certified [employer] [phone]

## 2017-03-18 NOTE — ROUTINE PROCESS
Bedside and Verbal shift change report given to ABRIL Rich (oncoming nurse) by Hilton (offgoing nurse). Report included the following information SBAR, Kardex, Intake/Output, MAR and Recent Results.

## 2017-03-18 NOTE — PROGRESS NOTES
0745 Assumed care of patient from [facility]. Patient on bipap resting with eyes closed, son and law at the bedside. 1101 Morphine 1 mg iv push prn given for complaints of headache, and abdominal breathing. See doc-flow sheet for follow up.    1431 Ativan 0.5 mg iv push given for restless, daughter at the bedside and agree with treatment. Will continue to monitor. 1545 Patient resting with eyes closed, on hi-flow O2., sats 85-88%. Patient now comfort care, daughter remains at bedside.

## 2017-03-18 NOTE — PROGRESS NOTES
CONFIRMED WITH PATIENT'S FAMILY THAT THEY DO NOT WISH PATIENT TO RECEIVE SCHEDULED NEB TX'S. DUONEBS ARE ORDERED FOR Q4PRN. WILL CANCEL BROVANA AND PULMICORT BID NEBS PER DR. Colleen Ley.  ALSO ADVISED Brynn Suh RN.

## 2017-03-18 NOTE — ROUTINE PROCESS
Bedside and Verbal shift change report given to SHUBHAM Jackson (oncoming nurse) by Hilotn (offgoing nurse). Report included the following information SBAR, Kardex, Intake/Output, MAR, Recent Results and Cardiac Rhythm NSR.

## 2017-03-18 NOTE — PROGRESS NOTES
Hospitalist Progress Note-critical care note     Patient: Maebelle Aschoff MRN: [id number]  CSN: [id number]    YOB: 1945  Age: 67 y.o. Sex: female    DOA: 3/16/2017 LOS:  LOS: 2 days            Chief complaint: hcap. Flu, acute respiratory failure , DM II     Assessment/Plan         Patient Active Problem List   Diagnosis Code    Chronic diarrhea K52.9    Cirrhosis of liver ([facility] [address] K74.60    Type II diabetes mellitus ([facility] [address] E11.9    Pancreatic mass K86.9    HTN (hypertension) I10    Pancreatic insufficiency K86.89    UTI (urinary tract infection) N39.0    Sepsis (HCC) A41.9    Acute metabolic encephalopathy Y09.03    CKD (chronic kidney disease) stage 3, GFR 30-59 ml/min N18.3    Thrombocytopenia (HCC) D69.6    History of stroke Z86.73    Dehydration E86.0    Urinary retention R33.9    Aspiration pneumonia ([facility]) J69.0    Altered mental status R41.82    SIRS (systemic inflammatory response syndrome) ([facility]) R65.10    HCAP (healthcare-associated pneumonia) J18.9    Acute respiratory failure with hypoxia ([facility]) J96.01       1.acute respiratory failure with hypoxia    on bipap last night ,   2. HCAP-New diffuse bilateral alveolar infiltrates-x-ray  On three abx,breathing tx and steroid   3. DM Tyep II  lantus and premel insulin   4 sepsis (poa)  5. Diarrhea   No bm since admission   6. Pancreatic insufficiency   7 elevated cr   Resolved   8 HTN  Continue home meds   9 hx of stroke   10 Flu   tamiflu   11 anxiety   Ativan prn     She is dnr/I , does not want to be intubated. She has flu with bilateral pna, on three abx, breathing tx and iv steroid, clinically not improving. -poor prognosis   Pt was put on bipap last night. Patient told her daughter \" It is enough for me\" she does not want to have bipap. Her daughter  DENISA, requested \"make her comfortable\"   She does not want to aggressive care including further imaging, blood gasses, cardiac monitoring and bipap. but still want to abx. Will consult palliative care, will start comfort care management per pt and family request, but  continue abx       Review of systems:    General: No fevers or chills. Cardiovascular: No chest pain or pressure. No palpitations. Pulmonary: shortness of breath. Gastrointestinal: No nausea, vomiting. Vital signs/Intake and Output:  Visit Vitals    /51    Pulse 64    Temp 98.4 °F (36.9 °C)    Resp 20    Ht 5' 5\" (1.651 m)    Wt 95.7 kg (210 lb 15.7 oz)    SpO2 (!) 82%    Breastfeeding No    BMI 35.11 kg/m2     Current Shift:     Last three shifts:  03/16 1901 - 03/18 0700  In: 1230 [P.O.:480; I.V.:750]  Out: 1600 [Urine:1600]    Physical Exam:  General: WD, WN. Alert, cooperative, on bipap    HEENT: NC, Atraumatic. PERRLA, anicteric sclerae. Lungs: + Wheezing/Rhonchi/Rales. Heart:  Regular  rhythm,  No murmur, No Rubs, No Gallops  Abdomen: Soft, Non distended, Non tender.  +Bowel sounds,   Extremities: No c/c/e  Psych:   No  anxious, no  agitated. Neurologic:  No acute neurological deficit. Labs: Results:       Chemistry Recent Labs      03/18/17   0640  03/17/17   0546  03/16/17   1006   GLU  274*  348*  262*   NA  139  146*  145   K  5.1  5.0  4.1   CL  110*  113*  104   CO2  21  20*  26   BUN  75*  52*  57*   CREA  1.39*  1.17  1.49*   CA  8.5  8.8  9.6   AGAP  8  13  15   BUCR  54*  44*  38*   AP   --    --   204*   TP   --    --   7.2   ALB   --    --   2.6*   GLOB   --    --   4.6*   AGRAT   --    --   0.6*      CBC w/Diff Recent Labs      03/18/17   0640  03/17/17   0546  03/16/17   1006   WBC  8.8  9.3  15.2*   RBC  3.61*  3.72*  4.22   HGB  10.5*  10.6*  12.3   HCT  33.7*  33.5*  37.5   PLT  159  171  202   GRANS  84*  88*  87*   LYMPH  11*  7*  6*   EOS  0  0  2      Cardiac Enzymes Recent Labs      03/16/17   1006   CPK  9*   CKND1  Cannot be calulated      Coagulation No results for input(s): PTP, INR, APTT in the last 72 hours.     No lab exists for component: INREXT, INREXT    Lipid Panel No results found for: CHOL, CHOLPOCT, CHOLX, CHLST, CHOLV, [id number], HDL, LDL, NLDLCT, DLDL, LDLC, DLDLP, [id number], VLDLC, VLDL, TGL, TGLX, TRIGL, [id number], TRIGP, TGLPOCT, [id number], CHHD, CHHDX   BNP No results for input(s): BNPP in the last 72 hours.    Liver Enzymes Recent Labs      03/16/17   1006   TP  7.2   ALB  2.6*   AP  204*   SGOT  30      Thyroid Studies Lab Results   Component Value Date/Time    TSH 1.43 12/06/2016 07:00 AM        Procedures/imaging: see electronic medical records for all procedures/Xrays and details which were not copied into this note but were reviewed prior to creation of Rama Phoenix, MD

## 2017-03-18 NOTE — PROGRESS NOTES
Renal Dosing Adjustment     The following medication: LEVAQUIN 750MG Q 24H was automatically dose-adjusted per [address] Protocol, with respect to renal function to LEVAQUIN 750MG Q48H. Pt Weight:   Wt Readings from Last 1 Encounters:   03/18/17 95.7 kg (210 lb 15.7 oz)       Serum Creatinine Lab Results   Component Value Date/Time    Creatinine 1.39 03/18/2017 06:40 AM       Creatinine Clearance Estimated Creatinine Clearance: 41.3 mL/min (based on Cr of 1.39). BUN Lab Results   Component Value Date/Time    BUN 75 03/18/2017 06:40 AM         Pharmacy to continue to monitor patient daily. Will make dosage adjustments based upon changing renal function. Signed Casey Hanley RPH.   Contact: [phone]

## 2017-03-19 NOTE — DIABETES MGMT
GLYCEMIC CONTROL & NUTRITION:    - noted pt is now comfort care, will comply with pt & family wishes  - DM education not appropriate at this time  - will continue to communicate with medical team       GIANNI Jones, MPH, RD

## 2017-03-19 NOTE — PROGRESS NOTES
PATIENT WAS TRANSFERRED FROM 3N TO 3S ON NRB WITHOUT INCIDENT. HFNC @ 50L/100% WAS RESUMED UPON ARRIVAL .

## 2017-03-19 NOTE — PROGRESS NOTES
PATIENT TUGGING AT HFNC. APPEARS TO BE UNCOMFORTABLE WITH FLOW. REDUCED FLOW FROM 70L TO 50L. FIO2 REMAINS %.

## 2017-03-19 NOTE — PROGRESS NOTES
1367: Shift Summary: Vital signs remained stable; Pain addressed with PRN meds; Pt. Remained on high flow 70L at 100%, bilateral lung sounds coarse, SpO2 70 -85%;  Cardiac rhythm: NSR; Pm meds tolerated without difficulty; Call bell left at reach; bed at lowest position; wheels locked

## 2017-03-19 NOTE — PROGRESS NOTES
Pharmacy Dosing Services: vancomycin    Consult for Vancomycin Dosing by Pharmacy by [facility] [place] provided for this 67y.o. year old female , for indication of HCAP. Day of Therapy 4    Trough drawn today is within therapeutic goal of 15-20 mcg/ml. Will continue with vancomyc 1500mg q24h. Ht Readings from Last 1 Encounters:   03/17/17 165.1 cm (65\")        Wt Readings from Last 1 Encounters:   03/19/17 94.1 kg (207 lb 7.3 oz)        Previous Regimen 1500mg q24h   Last Level 3/19/17 @ 1305 = 18.6 mcg/ml   Other Current Antibiotics Aztreonam 2gm q8h and levaquin 750mg q48h   Significant Cultures Blood cx 3/16/17 =  no growth so far   Serum Creatinine Lab Results   Component Value Date/Time    Creatinine 1.39 03/18/2017 06:40 AM      Creatinine Clearance Estimated Creatinine Clearance: 40.9 mL/min (based on Cr of 1.39). BUN Lab Results   Component Value Date/Time    BUN 75 03/18/2017 06:40 AM      WBC Lab Results   Component Value Date/Time    WBC 8.8 03/18/2017 06:40 AM      H/H Lab Results   Component Value Date/Time    HGB 10.5 03/18/2017 06:40 AM      Platelets Lab Results   Component Value Date/Time    PLATELET 086 75/02/5461 06:40 AM      Temp 97.2 °F (36.2 °C)        Pharmacy to follow daily and will make changes to dose and/or frequency based on clinical status.          Pharmacist Jose L Henriquez, [facility]

## 2017-03-19 NOTE — ROUTINE PROCESS
Verbal shift change report given to Ethan Benitez RN (oncoming nurse) by Bettie Maldonado   (offgoing nurse). Report included the following information SBAR, Kardex and MAR.

## 2017-03-19 NOTE — PROGRESS NOTES
Hospitalist Progress Note-critical care note     Patient: Florence Hdez MRN: [id number]  CSN: [id number]    YOB: 1945  Age: 67 y.o. Sex: female    DOA: 3/16/2017 LOS:  LOS: 3 days            Chief complaint: hcap. Flu, acute respiratory failure , DM II     Assessment/Plan         Patient Active Problem List   Diagnosis Code    Chronic diarrhea K52.9    Cirrhosis of liver ([facility] [address] K74.60    [facility] E11.9    Pancreatic mass K86.9    HTN (hypertension) I10    Pancreatic insufficiency K86.89    UTI (urinary tract infection) N39.0    Sepsis (HCC) A41.9    Acute metabolic encephalopathy W44.90    CKD (chronic kidney disease) stage 3, GFR 30-59 ml/min N18.3    Thrombocytopenia (HCC) D69.6    History of stroke Z86.73    Dehydration E86.0    Urinary retention R33.9    Aspiration pneumonia ([facility]) J69.0    Altered mental status R41.82    SIRS (systemic inflammatory response syndrome) ([facility]) R65.10    HCAP (healthcare-associated pneumonia) J18.9    Acute respiratory failure with hypoxia ([facility]) J96.01       1.acute respiratory failure with hypoxia    on high flow O2,   2. HCAP-New diffuse bilateral alveolar infiltrates-x-ray  On three abx,breathing tx and steroid   3. DM Tyep II  lantus , not like be poked   4 sepsis (poa)  5. Diarrhea   No bm since admission   6. Pancreatic insufficiency   7 elevated cr   8 HTN  Continue home meds   9 hx of stroke   10 Flu   tamiflu   11 anxiety   Ativan prn     She is on comfortcare management per family and patient request, will continue oral medication and  High flow O2 and abx -family agrees. She looks relatively comfortable. Subjective: I am ok. More comfortable than the machine     Will transfer medical if having space for room 320. Daughter at the bedside   Review of systems:    General: feel OK  Cardiovascular: No chest pain or pressure. No palpitations. Pulmonary: shortness of breath.    Gastrointestinal: No nausea, vomiting. Vital signs/Intake and Output:  Visit Vitals    /58 (BP 1 Location: Left arm, BP Patient Position: At rest)    Pulse 72    Temp 97.2 °F (36.2 °C)    Resp 20    Ht 5' 5\" (1.651 m)    Wt 94.1 kg (207 lb 7.3 oz)    SpO2 (!) 82%    Breastfeeding No    BMI 34.52 kg/m2     Current Shift:  03/19 0701 - 03/19 1900  In: 120 [P.O.:120]  Out: -   Last three shifts:  03/17 1901 - 03/19 0700  In: 8657 [P.O.:720; I.V.:950]  Out: 600 [Urine:600]    Physical Exam:  General: WD, WN. Alert, cooperative, on bipap    HEENT: NC, Atraumatic. PERRLA, anicteric sclerae. Lungs: + Wheezing/Rhonchi/Rales. Heart:  Regular  rhythm,  No murmur, No Rubs, No Gallops  Abdomen: Soft, Non distended, Non tender.  +Bowel sounds,   Extremities: No c/c/e  Psych:   No  anxious, no  agitated. Neurologic:  No acute neurological deficit. Labs: Results:       Chemistry Recent Labs      03/18/17   0640  03/17/17   0546  03/16/17   1006   GLU  274*  348*  262*   NA  139  146*  145   K  5.1  5.0  4.1   CL  110*  113*  104   CO2  21  20*  26   BUN  75*  52*  57*   CREA  1.39*  1.17  1.49*   CA  8.5  8.8  9.6   AGAP  8  13  15   BUCR  54*  44*  38*   AP   --    --   204*   TP   --    --   7.2   ALB   --    --   2.6*   GLOB   --    --   4.6*   AGRAT   --    --   0.6*      CBC w/Diff Recent Labs      03/18/17   0640  03/17/17   0546  03/16/17   1006   WBC  8.8  9.3  15.2*   RBC  3.61*  3.72*  4.22   HGB  10.5*  10.6*  12.3   HCT  33.7*  33.5*  37.5   PLT  159  171  202   GRANS  84*  88*  87*   LYMPH  11*  7*  6*   EOS  0  0  2      Cardiac Enzymes Recent Labs      03/16/17   1006   CPK  9*   CKND1  Cannot be calulated      Coagulation No results for input(s): PTP, INR, APTT in the last 72 hours.     No lab exists for component: INREXT, INREXT    Lipid Panel No results found for: CHOL, CHOLPOCT, CHOLX, CHLST, CHOLV, [id number], HDL, LDL, NLDLCT, DLDL, LDLC, DLDLP, [id number], VLDLC, VLDL, TGL, TGLX, TRIGL, [id number], TRIGP, TGLPOCT, [id number], CHHD, CHHDX   BNP No results for input(s): BNPP in the last 72 hours.    Liver Enzymes Recent Labs      03/16/17   1006   TP  7.2   ALB  2.6*   AP  204*   SGOT  30      Thyroid Studies Lab Results   Component Value Date/Time    TSH 1.43 12/06/2016 07:00 AM        Procedures/imaging: see electronic medical records for all procedures/Xrays and details which were not copied into this note but were reviewed prior to creation of Margie Starks MD

## 2017-03-19 NOTE — ROUTINE PROCESS
TRANSFER - IN REPORT:    Verbal report received from [facility], RN(name) on [address]  being received from [facility]  (unit) for routine progression of care      Report consisted of patients Situation, Background, Assessment and   Recommendations(SBAR). Information from the following report(s) SBAR and Kardex was reviewed with the receiving nurse. Opportunity for questions and clarification was provided. Assessment completed upon patients arrival to unit and care assumed.

## 2017-03-19 NOTE — ROUTINE PROCESS
TRANSFER - OUT REPORT:    Verbal report given to [facility] (name) on Pennie Bowie  being transferred to [facility] (unit) for change in patient condition(comfort care)       Report consisted of patients Situation, Background, Assessment and   Recommendations(SBAR). Information from the following report(s) SBAR, Kardex, Intake/Output, MAR and Recent Results was reviewed with the receiving nurse. Lines:   Peripheral IV 03/18/17 Left Arm (Active)        Opportunity for questions and clarification was provided.       Patient transported with:   Registered Nurse  Tech

## 2017-03-20 NOTE — PROGRESS NOTES
Received request from daughter to check to see if patient is okay. Patient presents with decreased respirations. Informed family that patient may soon be passing.  notified.

## 2017-03-20 NOTE — PROGRESS NOTES
20:00 Assessment completed. Hi-flow O2 per NC. Lungs are coarse bilat. Repositioned for comfort. Family is @ bedside for comfort. 23:00 Shift assessment completed. See nsg flow sheet for details. 03:00 Reassessed with 0 changes noted. Resting quietly in bed with eyes closed between cares. Daughters remain @ bedside in recliner & chair. 07:25 Bedside and Verbal shift change report given to DIANA Lehman RN (oncoming nurse) by Daniela Boswell RN (offgoing nurse). Report included the following information SBAR.

## 2017-03-20 NOTE — PROGRESS NOTES
Shift summary: Pt. Transferred from [facility]. Pt. On comfort care, Ativan given per family request or signs of discomfort/agitation. [place]wick intact and draining clear yellow urine. Bed linen/pad changed and pt. Repositioned made comfortable with pillows. Family bedside.

## 2017-03-20 NOTE — PROGRESS NOTES
Bereavement Note:     responded to the death of Tim Perez, who is a 67 y.o., female, offering Spiritual Care to patient and family, see flow sheets for interventions.  was able to respond as patient was taking her last breath. Daughters and one son-in-law at the bedside. Had prayer with them. Members of [employer] in [place]. S-i-l will notify the  of her death. Daughters were grieving appropriately. Had prayer at the bedside. Patient was declined by Sky Armendariz. Waiting to hear from [employer]. [address] was chosen but has not been called as I am waiting to hear back from [employer]. Family asked me to notify Brittany Kilpatrick at the  home. Daughters were very grateful for the care she received from Diana. \"     Date of Death: 3/20/17  Time of Death: 1335    Extended Emergency Contact Information  Primary Emergency Contact: Jamari [address] [employer] Phone: [phone]  Mobile Phone: [phone]  Relation: Daughter   [address] [facility]  Jamari [address]  [phone] (C)                YES      NO  UNKNOWN  Life [employer]   []        [x]    []   [employer]   [] [] [x]   Medical Examiner  []        [x]  []   Going to [employer]  []        [x] []      Autopsy   []        [x]         []   Sympathy Card  [x]        []  Bereavement Materials  []        [x]           Business Card Provided  []        []              Home: [facility] - [phone] [facility] will continue to follow family and will provide spiritual care as needed. [address] [facility]   Board Certified   [phone] - Office

## 2017-03-20 NOTE — PROGRESS NOTES
Dr. Adore Meeks informed CM that no CM services were needed as pt is comfort care and may die soon here in the hospital.

## 2017-03-20 NOTE — CONSULTS
[facility]: [phone] ([phone])  [facility]: [phone]   [facility]: [phone]    Patient Name: Maebelle Aschoff  YOB: 1945    Date of Initial Consult: 3/20/17  Reason for Consult: care decisions/symptom mgmt  Requesting Provider: Dr. Tracy Chavez   Primary Care Physician: Austin Tran MD      SUMMARY:   Maebelle Aschoff is a 67y.o. year old with a past history of COPD with multiple recent admission since 12/1, discharged to [facility] two weeks ago, contracted flu and bilat pneumonia and returned on 3/16. She had previous episode of respiratory failure requiring tracheostomy in 2011 w/ protracted recovery and long term skin breakdown. With deterioration of status this admission she made it clear she did not desire reintubation and a brief trial of BIPAP 3/17-3/18 was not tolerated. Since then she has been on high flow 02 and continued abx, but since yesterday has progressed to unresponsiveness. Daughter desired no escalation of care and patient now progressing to advanced respiratory failure. PALLIATIVE DIAGNOSES:     Patient Active Problem List   Diagnosis Code    Chronic diarrhea K52.9    Cirrhosis of liver ([facility] K74.60    Type II diabetes mellitus (HCC) E11.9    Pancreatic mass K86.9    HTN (hypertension) I10    Pancreatic insufficiency K86.89    UTI (urinary tract infection) N39.0    Sepsis (HCC) A41.9    Acute metabolic encephalopathy U92.51    CKD (chronic kidney disease) stage 3, GFR 30-59 ml/min N18.3    Thrombocytopenia (HCC) D69.6    History of stroke Z86.73    Dehydration E86.0    Urinary retention R33.9    Aspiration pneumonia (HCC) J69.0    Altered mental status R41.82    SIRS (systemic inflammatory response syndrome) (HCC) R65.10    HCAP (healthcare-associated pneumonia) J18.9    Acute respiratory failure with hypoxia (HCC) J96.01   1.   2. PLAN:   1.  Met with two daughters who provided history. Their understanding of current status and progression is very good and they are ready to proceed with pure comfort care plan, which they could not do on 3/18. Agree with discontinuation of all aggressive measures including labwork, IVF, and abx. Shared that terminal timecourse is likely hours to a day or two and that is not stable enough to consider transfer. Questions answered, discussed w/ Dr. Gerardo Claudio and CM. Comfort orders place. 2.   3. Initial consult note routed to primary continuity provider  4. Communicated plan of care with: Palliative IDT       GOALS OF CARE:   comfort    Advance Care Planning 3/16/2017   Patient's Healthcare Decision Maker is: [employer]   Primary Decision Maker Name [employer]   Primary Decision Maker Phone Number [phone]   Primary Decision Maker Relationship to Patient -   Confirm Advance Directive Yes, on file           HISTORY:     History obtained from:     CHIEF COMPLAINT: see summary    HPI/SUBJECTIVE:    The patient is:   [] Verbal and participatory  [x] Non-participatory due to:   sedated     Clinical Pain Assessment (nonverbal scale for nonverbal patients):           FUNCTIONAL ASSESSMENT:     Palliative Performance Scale (PPS):          PSYCHOSOCIAL/SPIRITUAL SCREENING:     Advance Care Planning:  Advance Care Planning 3/16/2017   Patient's Healthcare Decision Maker is: [employer]   Primary Decision Maker Name [employer]   Primary Decision Maker Phone Number [phone]   Primary Decision Maker Relationship to Patient -   Confirm Advance Directive Yes, on file        Any spiritual / Anabaptist concerns:  [] Yes /  [] No    Caregiver Burnout:  [] Yes /  [] No /  [] No Caregiver Present      Anticipatory grief assessment:   [] Normal  / [] Maladaptive       ESAS Anxiety:      ESAS Depression:          REVIEW OF SYSTEMS:     Positive and pertinent negative findings in ROS are noted above in HPI.   The following systems were [] reviewed / [] unable to be reviewed as noted in HPI  Other findings are noted below. Systems: constitutional, ears/nose/mouth/throat, respiratory, gastrointestinal, genitourinary, musculoskeletal, integumentary, neurologic, psychiatric, endocrine. Positive findings noted below. Modified ESAS Completed by: provider                                   Stool Occurrence(s): 0        PHYSICAL EXAM:     Wt Readings from Last 3 Encounters:   03/20/17 92.7 kg (204 lb 6.4 oz)   03/07/17 98 kg (216 lb)   02/16/17 96.2 kg (212 lb)     Blood pressure (!) 110/31, pulse (!) 106, temperature 99.7 °F (37.6 °C), resp. rate (!) 38, height 5' 5\" (1.651 m), weight 92.7 kg (204 lb 6.4 oz), SpO2 (!) 68 %, not currently breastfeeding.   Pain:  Pain Scale 1: FLACC  Pain Intensity 1: 0     Pain Location 1: Head        Pain Intervention(s) 1: Medication (see MAR)  Last bowel movement:     Constitutional: NAD  Eyes: pupils equal, anicteric  ENMT: no nasal discharge, moist mucous membranes  Cardiovascular: regular rhythm, distal pulses intact  Respiratory: breathing not labored, symmetric  Gastrointestinal: soft non-tender, +bowel sounds  Musculoskeletal: no deformity, no tenderness to palpation  Skin: warm, dry  Neurologic: not alert  Psychiatric:   Other:       HISTORY:     Principal Problem:    Acute respiratory failure with hypoxia (HCC) (3/16/2017)    Active Problems:    Chronic diarrhea (8/27/2015)      Type II diabetes mellitus ([address] (8/27/2015)      HTN (hypertension) (8/31/2015)      Pancreatic insufficiency (8/31/2015)      Sepsis ([address] (12/5/2016)      HCAP (healthcare-associated pneumonia) (3/16/2017)      Past Medical History:   Diagnosis Date    Arrhythmia     pt reports a heart murmur    Chronic obstructive pulmonary disease ([address]     hx of pneumonia    Diabetes ([address] 2003    Diarrhea     Hypertension     takes B/P meds to protect kidneys    Liver disease     testing for cirrhosis    Osteoarthritis     Pneumonia 2010      Past Surgical History:   Procedure Laterality Date    BREAST SURGERY PROCEDURE UNLISTED Bilateral     fibroid cysts    HX CHOLECYSTECTOMY      HX HYSTERECTOMY      HX MOHS PROCEDURES      HX ORTHOPAEDIC        History reviewed. No pertinent family history. History reviewed, no pertinent family history. Social History   Substance Use Topics    Smoking status: Never Smoker    Smokeless tobacco: Never Used    Alcohol use No     Allergies   Allergen Reactions    Darvon [Propoxyphene] Shortness of Breath    Pcn [Penicillins] Swelling    Tylenol [Acetaminophen] Other (comments)     Confusion      Current Facility-Administered Medications   Medication Dose Route Frequency    haloperidol (HALDOL) 2 mg/mL oral solution 2 mg  2 mg Oral Q4H PRN    morphine (ROXANOL) 100 mg/5 mL (20 mg/mL) concentrated solution 10 mg  10 mg Oral Q2H PRN    scopolamine (TRANSDERM-SCOP) 1.5 mg  1.5 mg TransDERmal Q72H    morphine injection 1 mg  1 mg IntraVENous Q15MIN PRN    LORazepam (ATIVAN) injection 0.5 mg  0.5 mg IntraVENous Q15MIN PRN    ondansetron (ZOFRAN) injection 4 mg  4 mg IntraVENous Q4H PRN    furosemide (LASIX) injection 20 mg  20 mg IntraVENous BID        LAB AND IMAGING FINDINGS:     Lab Results   Component Value Date/Time    WBC 8.8 03/18/2017 06:40 AM    HGB 10.5 03/18/2017 06:40 AM    PLATELET 987 53/70/5668 06:40 AM     Lab Results   Component Value Date/Time    Sodium 156 03/20/2017 01:38 AM    Potassium 4.6 03/20/2017 01:38 AM    Chloride 118 03/20/2017 01:38 AM    CO2 29 03/20/2017 01:38 AM    BUN 66 03/20/2017 01:38 AM    Creatinine 1.17 03/20/2017 01:38 AM    Calcium 8.9 03/20/2017 01:38 AM    Magnesium 1.7 03/04/2017 03:00 PM    Phosphorus 3.7 08/31/2015 02:00 AM      Lab Results   Component Value Date/Time    AST (SGOT) 30 03/16/2017 10:06 AM    Alk.  phosphatase 204 03/16/2017 10:06 AM    Protein, total 7.2 03/16/2017 10:06 AM    Albumin 2.6 03/16/2017 10:06 AM    Globulin 4.6 03/16/2017 10:06 AM     Lab Results Component Value Date/Time    INR 1.2 12/05/2016 09:21 PM    Prothrombin time 14.5 12/05/2016 09:21 PM    aPTT 25.1 08/25/2015 10:30 AM      Lab Results   Component Value Date/Time    Iron 81 10/05/2015 04:55 AM    TIBC 308 10/05/2015 04:55 AM    Iron % saturation 26 10/05/2015 04:55 AM    Ferritin 294 10/05/2015 04:55 AM      No results found for: PH, PCO2, PO2  No components found for: [place]   Lab Results   Component Value Date/Time    CK 9 03/16/2017 10:06 AM    CK - MB <1.0 03/16/2017 10:06 AM              Total time: 50 min  Counseling / coordination time: 45  > 50% counseling / coordination        Saad Epps MD  Palliative Medicine

## 2017-03-20 NOTE — PROGRESS NOTES
Daughter request that patient's high flow oxygen be removed and that patient is given pain and anti-anxiety medication. RT contacted regarding O2. Medications given as requested by family. Call light in reach, safety and comfort measures are in place.

## 2017-03-20 NOTE — ACP (ADVANCE CARE PLANNING)
No copy of Advance Directive is in EMR. Patient has 2 daughters,  Maame Aleman and Dmitry. They state that patient does have an Advance Directive. Copy requested. Both daughters state that , [facility] [phone], cell [phone]. DDNR is in EMR.

## 2017-03-20 NOTE — PROGRESS NOTES
Death note :      Paged to bedside TO ACCESS THE PATIENT. PT DID NOT RESPONSE TO  VERBAL OR PAIN STIMULI. NO BREATHING SOUND HEARD OVER 1 MIN. NO CORNEAL REFLUX ADD PUPIL DILATED. TIME OF DEATH : 13:35        FAMILY WAS at the bedside.

## 2017-03-20 NOTE — DISCHARGE SUMMARY
Discharge Summary    Patient: Itzel Montano MRN: [id number]  CSN: [id number]    YOB: 1945  Age: 67 y.o. Sex: female    DOA: 3/16/2017 LOS:  LOS: 4 days   Discharge Date:      Primary Care Provider:  Manuel Devries MD    Admission Diagnoses: Sepsis [facility])    Discharge Diagnoses:    Patient Active Problem List   Diagnosis Code    Chronic diarrhea K52.9    Cirrhosis of liver ([facility] [address] K74.60    [facility] E11.9    Pancreatic mass K86.9    HTN (hypertension) I10    Pancreatic insufficiency K86.89    UTI (urinary tract infection) N39.0    Sepsis ([facility] [address] A41.9    Acute metabolic encephalopathy S89.56    CKD (chronic kidney disease) stage 3, GFR 30-59 ml/min N18.3    Thrombocytopenia (HCC) D69.6    History of stroke Z86.73    Dehydration E86.0    Urinary retention R33.9    Aspiration pneumonia (HCC) J69.0    Altered mental status R41.82    SIRS (systemic inflammatory response syndrome) ([facility]) R65.10    HCAP (healthcare-associated pneumonia) J18.9    Acute respiratory failure with hypoxia ([facility]) J96.01         Procedures : none     Consults: None                               Admission HPI :   Itzel Montano is a 67 y.o. female who hx of HTN, DM type II,cirrhosis, copd, was sent to ED due to sob. She was d/c to rehab recently. She had fever and chest pain with sob in rehab and she was tested positive flu and treated with tamiflu one dose last night, also treated as PNA with levaquin. Diarrhea for one day. She was found desat per EMS and was put on non rebreathing mask. Cxr: bilateral infiltrate vs edema, bnp was 3300. Wbc 15, lactic acid 2.6, cr 1.49   abg : hypoxia. Received fluid in ED. Hospital Course :   Since she was admitted, bipap and non rebreathing mask were put on for acute respiratory failure with hypoxia s/p flu and HCAP. Broad coverage abx/beathing tx and iv steroid  administrated for the sepsis. Timiful was given for flu. She was DNR/I.  Family and patient did not want to bipap for patient and refused the offer for pulmonary on board. They wanted to continue abx and steroid and high flow nc O2. Palliative care was on board. Patient's situation did not respond to treatment. abx and julianna flow O2 was hold per family request.     Paged to bedside TO ACCESS THE PATIENT.      PT DID NOT RESPONSE TO VERBAL OR PAIN STIMULI. NO BREATHING SOUND HEARD OVER 1 MIN. NO CORNEAL REFLUX ADD PUPIL DILATED. TIME OF DEATH : 13:35 on March 20,2017        FAMILY WAS at the bedside  Minutes spent on discharge: 60 min     Significant Diagnostic Studies: Ct Head Wo Cont    Result Date: 3/4/2017  EXAM: CT head INDICATION: Altered mental status COMPARISON: CT brain 12/5/2016 TECHNIQUE: Axial CT imaging of the head was performed without intravenous contrast. Coronal and sagittal reconstructions were performed. One or more dose reduction techniques were used on this CT: automated exposure control, adjustment of the mAs and/or kVp according to patient's size, and iterative reconstruction techniques. The specific techniques utilized on this CT exam have been documented in the patient's electronic medical record. _______________ FINDINGS: BRAIN AND POSTERIOR FOSSA: There is significant motion artifact particularly across the inferior portions of the brain. Ventricles and sulci continue be mildly prominent but stable with mild white matter hypodensity. Stable chronic lacunar infarct superior right basal ganglia. There is no intracranial hemorrhage, mass effect, or midline shift. No evidence of acute cortical infarct. EXTRA-AXIAL SPACES AND MENINGES: There are no abnormal extra-axial fluid collections. CALVARIUM: Intact. SINUSES: Clear. OTHER: Mastoid air cells and orbital structures are unremarkable. _______________     IMPRESSION: 1. No evidence of acute infarct, hemorrhage or mass. 2. Stable cerebral atrophy with microvascular ischemic changes.  3. Stable chronic superior right basal ganglia lacunar infarct. Ct Abd Pelv W Cont    Result Date: 3/4/2017  EXAM: CT of the abdomen and pelvis INDICATION: Abdominal distention. Elevated lactic acid with hypoactive bowel sounds. COMPARISON: CT abdomen and pelvis 4/23/2014 TECHNIQUE: Axial CT imaging of the abdomen and pelvis was performed with intravenous contrast. Multiplanar reformats were generated. One or more dose reduction techniques were used on this CT: automated exposure control, adjustment of the mAs and/or kVp according to patient's size, and iterative reconstruction techniques. The specific techniques utilized on this CT exam have been documented in the patient's electronic medical record. _______________ FINDINGS: LOWER CHEST: There is significant respiratory motion artifact in the lung bases. This severely limits evaluation of lung bases. Probably chronic interstitial changes without focal consolidation or effusion. LIVER, BILIARY: Mildly lobulated appearance of the liver suggesting cirrhosis is not significantly changed. No focal hepatic masses. No biliary dilation. Status post cholecystectomy. PANCREAS: There is a 2.3 cm cystic mass along the inferior body of the pancreas. This is new compared to prior exam. This may represent pseudocyst. Pancreas is otherwise unremarkable. SPLEEN: Spleen is enlarged measuring 15 cm craniocaudally. This is increased from prior study when it measured 12 cm. Small splenule anterior to the inferior spleen is unchanged. ADRENALS: Normal. KIDNEYS: Bilateral nonobstructing renal calculi which have increased in size from previous examination. No hydronephrosis or solid renal mass. LYMPH NODES: No enlarged lymph nodes. GASTROINTESTINAL TRACT: No evidence of bowel obstruction. There is some loops of small bowel in the midabdomen just to left of midline which has some mild mural thickening. There is also some subtle induration of the fat planes adjacent to the duodenum with some mural thickening of the duodenum. Scattered colonic diverticuli. PELVIC ORGANS: Status post hysterectomy. No significant change in large bilateral cystic adnexal masses. These were present on prior CT in 2014. Minimal air in the nondependent position the bladder. Bladder is otherwise unremarkable. VASCULATURE: SMA is patent without stenosis or thrombus. BONES: No acute or aggressive osseous abnormalities identified. OTHER: Minimal ascites adjacent to the liver and spleen. No evidence of pneumoperitoneum _______________     IMPRESSION: 1. No evidence of bowel obstruction or pneumoperitoneum. There are several loops of small bowel in the left midabdomen with mural thickening. Internal hernia could conceivably cause this appearance however there is no surrounding induration or pneumatosis. 2. There is subtle induration of the fat adjacent to the duodenum with some mural thickening which could be related to peptic ulcer disease. Again no evidence of perforation. 3. Small cystic mass adjacent to the body of the pancreas may represent pseudocyst. This is a new finding compared to prior exam. 4. Cirrhotic appearance of the liver with splenomegaly suggesting portal hypertension. 5. Bilateral large nonobstructing renal calculi. 6. Stable bilateral cystic adnexal masses which are probably ovarian. These were present in 2014. 7. Minimal air in the nondependent position of the bladder. Recommend correlation as to whether there has been recent catheterization. 8. Uncomplicated colonic diverticulosis. Xr Chest Port    Result Date: 3/16/2017  EXAM: Chest portable INDICATION: Sepsis COMPARISON: Single view chest 3/4/2017 _______________ FINDINGS: AP portable chest film was performed. Significant deterioration of the chest with diffuse alveolar infiltrates which are superimposed on presumed prior chronic interstitial changes. Cannot rule out small right effusion. No pneumothorax.  Heart is suboptimally evaluated as is the pulmonary vascularity secondary to the infiltrates. _______________     IMPRESSION: 1. New diffuse bilateral alveolar infiltrates. Differential includes pulmonary edema versus diffuse bilateral pneumonia. Xr Chest Port    Result Date: 3/4/2017  EXAM: Chest portable INDICATION: Altered mental status. Short of breath. COMPARISON: Single view chest 2/3/2017 and 12/5/2016 _______________ FINDINGS: AP portable chest film was performed. There continue to be diffusely increased interstitial markings which have been present on previous examinations and likely represent chronic interstitial lung disease. No focal consolidation. No effusion or pneumothorax. Heart remains enlarged. Pulmonary vascularity is normal. _______________     IMPRESSION: 1. Stable chest with chronic interstitial lung disease. Cannot completely exclude superimposed mild interstitial pulmonary edema. 2. Stable cardiomegaly.              [facility]     CC: Maricruz Pérez MD

## 2017-03-21 PROCEDURE — 3331090001 HH PPS REVENUE CREDIT

## 2017-03-21 PROCEDURE — 3331090002 HH PPS REVENUE DEBIT

## 2017-03-22 LAB
BACTERIA SPEC CULT: NORMAL
SERVICE CMNT-IMP: NORMAL

## 2017-03-22 PROCEDURE — 3331090002 HH PPS REVENUE DEBIT

## 2017-03-22 PROCEDURE — 3331090001 HH PPS REVENUE CREDIT

## 2020-05-21 NOTE — ED NOTES
Pt lying on stretcher, reports no pain at this time, no distress noted, Pt remains on monitor and call bell within reach, denies needing bathroom,  room safety check completed, informed of status, awaiting admission, will continue to monitor. The only one in [place] that I'm aware of is  Rheumatology.  We could maybe try [facility]?

## 2022-12-01 NOTE — ED NOTES
at bedside.
Discharge instructions given to pt. All questions answered and pt verbalized understanding. V/S stable @ time of discharge.   Pt with jong pushing her in wheelchair
Dr. Gil Servin made aware that patient reports her pain is a 8/10 and does want something for pain
RN in room for purpose of hourly rounding. Room checked for trash and safety hazards. Patient is. ..    [  ] seated at bedside. [  x] lying in bed with side rails up and call bell in reach. [  ] lying in with call bell in reach and continuous monitoring in place. Pain reduction interventions. .. [x  ] not indicated at this time      include the following   [  ] administration of analgesic medications   [  ] lights turned down   [  ] patient offered a cool washcloth   [  ] heat applied   [  ] ice applied   [  ] patient repositioned    Restroom needs addressed. Patient is. ...    [  ] Not in need restroom assistance at this time  [  ] Ambulatory as needed to the restroom  [  ] Assisted to bedside commode  [x  ] Assisted with use of bed pan  [  ] Provided with a urinal    Position. ..    [  ] Patient does not require assistance with repositioning  [ x ] Patient repositions with assistance of RN  [  ] Patient repositioned with assistance of RN and other ED staff.
RN in room for purpose of hourly rounding. Room checked for trash and safety hazards. Patient is. ..    [  ] seated at bedside. [ x ] lying in bed with side rails up and call bell in reach. [  ] lying in with call bell in reach and continuous monitoring in place. Pain reduction interventions. ..    [  ] not indicated at this time      include the following   [x  ] administration of analgesic medications   [  ] lights turned down   [  ] patient offered a cool washcloth   [  ] heat applied   [  ] ice applied   [ x ] patient repositioned    Restroom needs addressed. Patient is. ... [ x ] Not in need restroom assistance at this time  [  ] Ambulatory as needed to the restroom  [  ] Assisted to bedside commode  [  ] Assisted with use of bed pan  [  ] Provided with a urinal    Position. ..    [  ] Patient does not require assistance with repositioning  [ x ] Patient repositions with assistance of RN  [  ] Patient repositioned with assistance of RN and other ED staff.
RN in room for purpose of hourly rounding. Room checked for trash and safety hazards. Patient is. ..    [  ] seated at bedside. [ x] lying in bed with side rails up and call bell in reach. [  ] lying in with call bell in reach and continuous monitoring in place. Pain reduction interventions. .. [  x] not indicated at this time      include the following   [  ] administration of analgesic medications   [  ] lights turned down   [  ] patient offered a cool washcloth   [  ] heat applied   [  ] ice applied   [  ] patient repositioned    Restroom needs addressed. Patient is. ...    [  ] Not in need restroom assistance at this time  [  ] Ambulatory as needed to the restroom  [  ] Assisted to bedside commode  [ x ] Assisted with use of bed pan  [  ] Provided with a urinal    Position. ..    [  ] Patient does not require assistance with repositioning  [  ] Patient repositions with assistance of RN  [x  ] Patient repositioned with assistance of RN and other ED staff.
Handoff